# Patient Record
Sex: MALE | Race: WHITE | NOT HISPANIC OR LATINO | Employment: STUDENT | URBAN - METROPOLITAN AREA
[De-identification: names, ages, dates, MRNs, and addresses within clinical notes are randomized per-mention and may not be internally consistent; named-entity substitution may affect disease eponyms.]

---
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
## 2023-02-11 ENCOUNTER — HOSPITAL ENCOUNTER (EMERGENCY)
Facility: HOSPITAL | Age: 23
End: 2023-02-14
Attending: EMERGENCY MEDICINE

## 2023-02-11 DIAGNOSIS — R45.1 AGITATION: Primary | ICD-10-CM

## 2023-02-11 DIAGNOSIS — S00.81XA ABRASION OF FOREHEAD, INITIAL ENCOUNTER: ICD-10-CM

## 2023-02-11 DIAGNOSIS — Z87.898 HISTORY OF SEIZURE: ICD-10-CM

## 2023-02-11 DIAGNOSIS — F10.10 ALCOHOL ABUSE: ICD-10-CM

## 2023-02-11 NOTE — LETTER
[address] [facility] [address]  Dept: [phone]      EMTALA TRANSFER CONSENT    NAME Araseli Godfrey                                         2000                              MRN [id number]    I have been informed of my rights regarding examination, treatment, and transfer   by Dr Josesito Helm DO    Benefits: Specialized equipment and/or services available at the receiving facility (Include comment)________________________    Risks: Potential for delay in receiving treatment      { ED EMTALA TRANSFER CHOICES:[id number]}    I authorize the performance of emergency medical procedures and treatments upon me in both transit and upon arrival at the receiving facility  Additionally, I authorize the release of any and all medical records to the receiving facility and request they be transported with me, if possible  I understand that the safest mode of transportation during a medical emergency is an ambulance and that the Hospital advocates the use of this mode of transport  Risks of traveling to the receiving facility by car, including absence of medical control, life sustaining equipment, such as oxygen, and medical personnel has been explained to me and I fully understand them  (ADIS CORRECT BOX BELOW)  [  ]  I consent to the stated transfer and to be transported by ambulance/helicopter  [  ]  I consent to the stated transfer, but refuse transportation by ambulance and accept full responsibility for my transportation by car    I understand the risks of non-ambulance transfers and I exonerate the Hospital and its staff from any deterioration in my condition that results from this refusal     X___________________________________________    DATE  23  TIME________  Signature of patient or legally responsible individual signing on patient behalf           RELATIONSHIP TO PATIENT_________________________          Provider Certification    NAME Araseli Godfrey                                         2000                              MRN [id number]    A medical screening exam was performed on the above named patient  Based on the examination:    Condition Necessitating Transfer The primary encounter diagnosis was Agitation  Diagnoses of Abrasion of forehead, initial encounter, History of seizure, and Alcohol abuse were also pertinent to this visit  Patient Condition: The patient has been stabilized such that within reasonable medical probability, no material deterioration of the patient condition or the condition of the unborn child(neptali) is likely to result from the transfer    Reason for Transfer: Level of Care needed not available at this facility    Transfer Requirements: Facility [facility]   · Space available and qualified personnel available for treatment as acknowledged by Katia Albarran MA  · Agreed to accept transfer and to provide appropriate medical treatment as acknowledged by       Dr Maggie Reddy  · Appropriate medical records of the examination and treatment of the patient are provided at the time of transfer   [address] [place] [address] [address] _______  · Transfer will be performed by qualified personnel from [address] [place] [address]  and appropriate transfer equipment as required, including the use of necessary and appropriate life support measures      Provider Certification: I have examined the patient and explained the following risks and benefits of being transferred/refusing transfer to the patient/family:  General risk, such as traffic hazards, adverse weather conditions, rough terrain or turbulence, possible failure of equipment (including vehicle or aircraft), or consequences of actions of persons outside the control of the transport personnel      Based on these reasonable risks and benefits to the patient and/or the unborn child(neptali), and based upon the information available at the time of the patient’s examination, I certify that the medical benefits reasonably to be expected from the provision of appropriate medical treatments at another medical facility outweigh the increasing risks, if any, to the individual’s medical condition, and in the case of labor to the unborn child, from effecting the transfer      X____________________________________________ DATE 02/14/23        TIME_______      ORIGINAL - SEND TO MEDICAL RECORDS   COPY - SEND WITH PATIENT DURING TRANSFER

## 2023-02-11 NOTE — LETTER
[address] [facility] [address]  Dept: [phone]      EMTALA TRANSFER CONSENT    NAME Arely Patel                                         2000                              MRN [id number]    I have been informed of my rights regarding examination, treatment, and transfer   by Dr Arslan Arciniega DO    Benefits: Specialized equipment and/or services available at the receiving facility (Include comment)________________________    Risks: Potential for delay in receiving treatment      Transfer Request   I acknowledge that my medical condition has been evaluated and explained to me by the emergency department physician or other qualified medical person and/or my attending physician who has recommended and offered to me further medical examination and treatment  I understand the Hospital's obligation with respect to the treatment and stabilization of my emergency medical condition  I nevertheless request to be transferred  I release the Hospital, the doctor, and any other persons caring for me from all responsibility or liability for any injury or ill effects that may result from my transfer and agree to accept all responsibility for the consequences of my choice to transfer, rather than receive stabilizing treatment at the Hospital  I understand that because the transfer is my request, my insurance may not provide reimbursement for the services  The Hospital will assist and direct me and my family in how to make arrangements for transfer, but the hospital is not liable for any fees charged by the transport service  In spite of this understanding, I refuse to consent to further medical examination and treatment which has been offered to me, and request transfer to  [facility] Name, [address] [facility]   I authorize the performance of emergency medical procedures and treatments upon me in both transit and upon arrival at the receiving facility  Additionally, I authorize the release of any and all medical records to the receiving facility and request they be transported with me, if possible  I authorize the performance of emergency medical procedures and treatments upon me in both transit and upon arrival at the receiving facility  Additionally, I authorize the release of any and all medical records to the receiving facility and request they be transported with me, if possible  I understand that the safest mode of transportation during a medical emergency is an ambulance and that the Hospital advocates the use of this mode of transport  Risks of traveling to the receiving facility by car, including absence of medical control, life sustaining equipment, such as oxygen, and medical personnel has been explained to me and I fully understand them  (ADIS CORRECT BOX BELOW)  [  ]  I consent to the stated transfer and to be transported by ambulance/helicopter  [  ]  I consent to the stated transfer, but refuse transportation by ambulance and accept full responsibility for my transportation by car  I understand the risks of non-ambulance transfers and I exonerate the Hospital and its staff from any deterioration in my condition that results from this refusal     X___________________________________________    DATE  23  TIME________  Signature of patient or legally responsible individual signing on patient behalf           RELATIONSHIP TO PATIENT_________________________          Provider Certification    NAME Yolanda Gaytan                                         2000                              MRN [id number]    A medical screening exam was performed on the above named patient  Based on the examination:    Condition Necessitating Transfer The primary encounter diagnosis was Agitation  Diagnoses of Abrasion of forehead, initial encounter, History of seizure, and Alcohol abuse were also pertinent to this visit      Patient Condition: The patient has been stabilized such that within reasonable medical probability, no material deterioration of the patient condition or the condition of the unborn child(neptali) is likely to result from the transfer    Reason for Transfer: Level of Care needed not available at this facility    Transfer Requirements: Facility [facility]   · Space available and qualified personnel available for treatment as acknowledged by Sera Neville MA  · Agreed to accept transfer and to provide appropriate medical treatment as acknowledged by       Dr Joshua Augustin  · Appropriate medical records of the examination and treatment of the patient are provided at the time of transfer   [address] _______  · Transfer will be performed by qualified personnel from [facility]  and appropriate transfer equipment as required, including the use of necessary and appropriate life support measures  Provider Certification: I have examined the patient and explained the following risks and benefits of being transferred/refusing transfer to the patient/family:  General risk, such as traffic hazards, adverse weather conditions, rough terrain or turbulence, possible failure of equipment (including vehicle or aircraft), or consequences of actions of persons outside the control of the transport personnel      Based on these reasonable risks and benefits to the patient and/or the unborn child(neptali), and based upon the information available at the time of the patient’s examination, I certify that the medical benefits reasonably to be expected from the provision of appropriate medical treatments at another medical facility outweigh the increasing risks, if any, to the individual’s medical condition, and in the case of labor to the unborn child, from effecting the transfer      X____________________________________________ DATE 02/14/23        TIME_______      ORIGINAL - SEND TO MEDICAL RECORDS   COPY - SEND WITH PATIENT DURING TRANSFER

## 2023-02-12 ENCOUNTER — APPOINTMENT (EMERGENCY)
Dept: RADIOLOGY | Facility: HOSPITAL | Age: 23
End: 2023-02-12

## 2023-02-12 ENCOUNTER — APPOINTMENT (EMERGENCY)
Dept: CT IMAGING | Facility: HOSPITAL | Age: 23
End: 2023-02-12

## 2023-02-12 LAB
ALBUMIN SERPL BCP-MCNC: 5.3 G/DL (ref 3.5–5)
ALP SERPL-CCNC: 48 U/L (ref 34–104)
ALT SERPL W P-5'-P-CCNC: 12 U/L (ref 7–52)
AMPHETAMINES SERPL QL SCN: NEGATIVE
ANION GAP SERPL CALCULATED.3IONS-SCNC: 19 MMOL/L (ref 4–13)
APAP SERPL-MCNC: <10 UG/ML (ref 10–20)
AST SERPL W P-5'-P-CCNC: 18 U/L (ref 13–39)
BARBITURATES UR QL: NEGATIVE
BASOPHILS # BLD AUTO: 0.12 THOUSANDS/ÂΜL (ref 0–0.1)
BASOPHILS NFR BLD AUTO: 1 % (ref 0–1)
BENZODIAZ UR QL: NEGATIVE
BILIRUB SERPL-MCNC: 0.49 MG/DL (ref 0.2–1)
BUN SERPL-MCNC: 13 MG/DL (ref 5–25)
CALCIUM SERPL-MCNC: 10.1 MG/DL (ref 8.4–10.2)
CHLORIDE SERPL-SCNC: 103 MMOL/L (ref 96–108)
CO2 SERPL-SCNC: 16 MMOL/L (ref 21–32)
COCAINE UR QL: NEGATIVE
CREAT SERPL-MCNC: 1.03 MG/DL (ref 0.6–1.3)
EOSINOPHIL # BLD AUTO: 0.05 THOUSAND/ÂΜL (ref 0–0.61)
EOSINOPHIL NFR BLD AUTO: 0 % (ref 0–6)
ERYTHROCYTE [DISTWIDTH] IN BLOOD BY AUTOMATED COUNT: 12 % (ref 11.6–15.1)
ETHANOL SERPL-MCNC: 46 MG/DL
FLUAV RNA RESP QL NAA+PROBE: NEGATIVE
FLUBV RNA RESP QL NAA+PROBE: NEGATIVE
GFR SERPL CREATININE-BSD FRML MDRD: 103 ML/MIN/1.73SQ M
GLUCOSE SERPL-MCNC: 101 MG/DL (ref 65–140)
HCT VFR BLD AUTO: 45.6 % (ref 36.5–49.3)
HGB BLD-MCNC: 15.6 G/DL (ref 12–17)
IMM GRANULOCYTES # BLD AUTO: 0.08 THOUSAND/UL (ref 0–0.2)
IMM GRANULOCYTES NFR BLD AUTO: 1 % (ref 0–2)
LYMPHOCYTES # BLD AUTO: 1.49 THOUSANDS/ÂΜL (ref 0.6–4.47)
LYMPHOCYTES NFR BLD AUTO: 10 % (ref 14–44)
MCH RBC QN AUTO: 30.5 PG (ref 26.8–34.3)
MCHC RBC AUTO-ENTMCNC: 34.2 G/DL (ref 31.4–37.4)
MCV RBC AUTO: 89 FL (ref 82–98)
METHADONE UR QL: NEGATIVE
MONOCYTES # BLD AUTO: 0.92 THOUSAND/ÂΜL (ref 0.17–1.22)
MONOCYTES NFR BLD AUTO: 6 % (ref 4–12)
NEUTROPHILS # BLD AUTO: 12.05 THOUSANDS/ÂΜL (ref 1.85–7.62)
NEUTS SEG NFR BLD AUTO: 82 % (ref 43–75)
NRBC BLD AUTO-RTO: 0 /100 WBCS
OPIATES UR QL SCN: NEGATIVE
OXYCODONE+OXYMORPHONE UR QL SCN: NEGATIVE
PCP UR QL: NEGATIVE
PLATELET # BLD AUTO: 354 THOUSANDS/UL (ref 149–390)
PMV BLD AUTO: 10.3 FL (ref 8.9–12.7)
POTASSIUM SERPL-SCNC: 3.4 MMOL/L (ref 3.5–5.3)
PROT SERPL-MCNC: 7.7 G/DL (ref 6.4–8.4)
RBC # BLD AUTO: 5.11 MILLION/UL (ref 3.88–5.62)
RSV RNA RESP QL NAA+PROBE: NEGATIVE
SALICYLATES SERPL-MCNC: <5 MG/DL (ref 3–20)
SARS-COV-2 RNA RESP QL NAA+PROBE: NEGATIVE
SODIUM SERPL-SCNC: 138 MMOL/L (ref 135–147)
THC UR QL: POSITIVE
WBC # BLD AUTO: 14.71 THOUSAND/UL (ref 4.31–10.16)

## 2023-02-12 RX ORDER — OLANZAPINE 10 MG/1
10 INJECTION, POWDER, LYOPHILIZED, FOR SOLUTION INTRAMUSCULAR ONCE
Status: COMPLETED | OUTPATIENT
Start: 2023-02-12 | End: 2023-02-12

## 2023-02-12 RX ORDER — NICOTINE 21 MG/24HR
21 PATCH, TRANSDERMAL 24 HOURS TRANSDERMAL ONCE
Status: COMPLETED | OUTPATIENT
Start: 2023-02-12 | End: 2023-02-13

## 2023-02-12 RX ORDER — LORAZEPAM 2 MG/ML
2 INJECTION INTRAMUSCULAR ONCE
Status: COMPLETED | OUTPATIENT
Start: 2023-02-12 | End: 2023-02-12

## 2023-02-12 RX ORDER — LORAZEPAM 1 MG/1
1 TABLET ORAL ONCE
Status: COMPLETED | OUTPATIENT
Start: 2023-02-12 | End: 2023-02-12

## 2023-02-12 RX ADMIN — NICOTINE 21 MG: 21 PATCH, EXTENDED RELEASE TRANSDERMAL at 18:22

## 2023-02-12 RX ADMIN — LORAZEPAM 1 MG: 1 TABLET ORAL at 18:22

## 2023-02-12 RX ADMIN — OLANZAPINE 10 MG: 10 INJECTION, POWDER, FOR SOLUTION INTRAMUSCULAR at 01:30

## 2023-02-12 RX ADMIN — LORAZEPAM 2 MG: 2 INJECTION INTRAMUSCULAR; INTRAVENOUS at 01:43

## 2023-02-12 NOTE — ED NOTES
Pt  Lying on ground  Went in to assess and pt  And assisted him onto the bed and he was placed on the bed with a blanket  Respirations easy at present       Anderson Ahumada, RN  02/12/23 5076

## 2023-02-12 NOTE — CONSULTS
TeleConsultation - [address] Zemaitaitis 25 y o  male MRN: [id number]  Unit/Bed#: Ingrid Maharaj 02 Encounter: [id number]        REQUIRED DOCUMENTATION:     1  This service was provided via Telemedicine  2  Provider located at [facility]   3  TeleMed provider: Frankie Corrales MD   4  Identify all parties in room with patient during tele consult: Patient   5  Patient was then informed that this was a Telemedicine visit and that the exam was being conducted confidentially over secure lines  My office door was closed  No one else was in the room  Patient acknowledged consent and understanding of privacy and security of the Telemedicine visit, and gave us permission to have the assistant stay in the room in order to assist with the history and to conduct the exam   I informed the patient that I have reviewed their record in Epic and presented the opportunity for them to ask any questions regarding the visit today  The patient agreed to participate  Discussed with Anum SORENSEN    Assessment/Plan     Active Problems:    * No active hospital problems  *    Assessment:  Shamir Malik is a 26 y/o male with PMH significant for Depression that presented on 302 for SI  Patient was intoxicated at the time of SI but has multiple risk factors that make him an acute or imminent safety concern warranting involuntary IP psychiatric admission  Suicide Risk Assessment: Acute: High, Imminent  Chronic: High,    Primary risk factors are Prior SAs, Substance Use, Impulsivity, Recent relationship loss  However, patient has access to and desire for care  He is future-oriented and cites family as reasons for living  Unspecified Depressive Disorder    Treatment Plan:    Planned Medication Changes:    -None     Current Medications:         Risks / Benefits of Treatment:    Risks, benefits, and possible side effects of medications explained to patient and patient verbalizes understanding        Inpatient consult to Psychiatry  Consult performed by: Do Fonseca MD  Consult ordered by: Crhistine Thompson DO        Physician Requesting Consult: Christine Thompson , *  Principal Problem:<principal problem not specified>    Reason for Consult: Psych Evaluation       History of Present Illness      Per Crisis Note by Brice Johnson:   Crisis met with pt to complete Crisis Intake and Safety Risk Assessment  Introduce self, role, and evaluation process  Pt is a 25year old male who presents in ED on a 302 for a psychiatric evaluation  Per 302 statement: `Cachorro stated to his girlfriend that he wanted to  "kill himself" and "couldn;t do it anymore " 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  Cachorro's cell phone was tracked to the bridge in [place]  He was located hiding underneath  Once located,he took off  After a foot sriram he was detained  When asked why he ran, he stated he was afraid  When asked afraid of what, he stated "at what I may do to myself " Pt was calm and cooperative during conversation  He appeared alert and oriented  Pt said "people lied on me to the police and said I wanted to jump over a bridge "  Pt refused to answer crisis questions and said he wants to go home, and is not willing to sign a 201  Pt presented his rights  Pt appear to understand these rights  Pt denies SI, HI, AVH or thoughts to self harm  Pt is willing to speak with psychiatry  Patient states that he came in due to drinking stating that he has some fines to pay and needs a job  Patient states that he is not suicidal currently and never had said that he was  Patient states that he remembers everything that happened the night of his intoxication and refused to answer questions regarding the break up  Patient denied any current SI/HI/AVH       Psychiatric Review Of Systems:    sleep: no  appetite changes: no  weight changes: no  energy/anergy: no  interest/pleasure/anhedonia: no  somatic symptoms: no  anxiety/panic: no  iris: no  guilty/hopeless: no  self injurious behavior/risky behavior: no    Historical Information     Past Psychiatric History:     Psychiatric Hospitalizations:   • One past inpatient [facility]   Outpatient Treatment History:   • Denied  Suicide Attempts:   • Yes, one attempt by hanging  History of self-harm:   • None  Violence History:   • yes  Past Psychiatric medication trials: Denied     Substance Abuse History: Patient states that he had stopped drinking but had trouble in the past  Patient states that he smokes both cannabis and cigarettes         Family Psychiatric History: Denied         Social History:     Education: high school diploma/GED  Learning Disabilities: Denied  Marital history: single  Children: Denied  Living arrangement, social support: The patient lives in home with alone  Occupational History: unknown occupation  Functioning Relationships: good support system    Other Pertinent History: None    Traumatic History:     Abuse: None  Other Traumatic Events: none    Past Medical History:   Diagnosis Date   • Depression        Medical Review Of Systems:    Review of Systems    Meds/Allergies     all current active meds have been reviewed  No Known Allergies    Objective     Vital signs in last 24 hours:  Temp:  [96 8 °F (36 °C)] 96 8 °F (36 °C)  HR:  [74] 74  Resp:  [16] 16  BP: (110)/(60) 110/60    No intake or output data in the 24 hours ending 02/12/23 1522    Mental Status Evaluation:    Appearance:  age appropriate   Behavior:  normal   Speech:  normal pitch and normal volume   Mood:  dysthymic   Affect:  labile   Language: naming objects   Thought Process:  logical   Associations intact associations   Thought Content:  normal   Perceptual Disturbances: None   Risk Potential: Suicidal Ideations none  Homicidal Ideations none  Potential for Aggression No   Sensorium:  person, place and time/date   Cognition:  recent and remote memory grossly intact   Consciousness:  alert    Attention: attention span and concentration were age appropriate   Intellect: within normal limits   Fund of Knowledge: awareness of current events: President   Insight:  limited   Judgment: limited   Muscle Strength:  Muscle Tone: normal NFT  normal   Gait/Station: normal gait/station   Motor Activity: no abnormal movements       Lab Results: I have personally reviewed all pertinent laboratory/tests results  Most Recent Labs:   Lab Results   Component Value Date    WBC 14 71 (H) 02/12/2023    RBC 5 11 02/12/2023    HGB 15 6 02/12/2023    HCT 45 6 02/12/2023     02/12/2023    RDW 12 0 02/12/2023    NEUTROABS 12 05 (H) 02/12/2023    SODIUM 138 02/12/2023    K 3 4 (L) 02/12/2023     02/12/2023    CO2 16 (L) 02/12/2023    BUN 13 02/12/2023    CREATININE 1 03 02/12/2023    GLUC 101 02/12/2023    CALCIUM 10 1 02/12/2023    AST 18 02/12/2023    ALT 12 02/12/2023    ALKPHOS 48 02/12/2023    TP 7 7 02/12/2023    ALB 5 3 (H) 02/12/2023    TBILI 0 49 02/12/2023     Drug Screen:   Lab Results   Component Value Date    AMPMETHUR Negative 02/12/2023    BARBTUR Negative 02/12/2023    BDZUR Negative 02/12/2023    THCUR Positive (A) 02/12/2023    COCAINEUR Negative 02/12/2023    METHADONEUR Negative 02/12/2023    OPIATEUR Negative 02/12/2023    PCPUR Negative 02/12/2023       Imaging Studies: CT head without contrast    Result Date: 2/12/2023  Narrative: CT BRAIN - WITHOUT CONTRAST INDICATION:   fall w head trauma  COMPARISON:  None  TECHNIQUE:  CT examination of the brain was performed  In addition to axial images, sagittal and coronal 2D reformatted images were created and submitted for interpretation  Radiation dose length product (DLP) for this visit:  905 85 mGy-cm     This examination, like all CT scans performed in the [facility], was performed utilizing techniques to minimize radiation dose exposure, including the use of iterative  reconstruction and automated exposure control  IMAGE QUALITY:  Diagnostic  FINDINGS: PARENCHYMA:  No intracranial mass, mass effect or midline shift  No CT signs of acute infarction  No acute parenchymal hemorrhage  VENTRICLES AND EXTRA-AXIAL SPACES:  Normal for the patient's age  VISUALIZED ORBITS: Normal visualized orbits  PARANASAL SINUSES: Normal visualized paranasal sinuses  CALVARIUM AND EXTRACRANIAL SOFT TISSUES:  Normal      Impression: No acute intracranial abnormality  Workstation performed: [id number]     EKG/Pathology/Other Studies: No results found for: VENTRATE, ATRIALRATE, PRINT, QRSDINT, QTINT, QTCINT, PAXIS, QRSAXIS, TWAVEAXIS     Code Status: No Order  Advance Directive and Living Will:      Power of :    POLST:      Counseling / Coordination of Care: Total floor / unit time spent today 30 minutes  Greater than 50% of total time was spent with the patient and / or family counseling and / or coordination of care  A description of the counseling / coordination of care: Direct Patient Care, Chart Review, and Documentation

## 2023-02-12 NOTE — ED NOTES
Lauren Suicide Risk Assessment deferred, as unable to assess while patient sleeping  Behavioral Health Assessment deferred as patient is sleeping and would benefit from additional rest   Vital signs deferred until patient awake, no signs or symptoms of respiratory distress at this time  Once patient is awake and able to participate, will complete assessments  Remains on continual observation by Lord Brut    Resting between disturbances     Eileen Elizondo RN  02/12/23 8640

## 2023-02-12 NOTE — ED NOTES
Pt  Continues to be extremely agitated , pacing the floor, throwing the mattress off the bed  Control team called  Offered to have him speak to his mother for 5 minutes on phone, however, he said he not able to speak for a short period  Dr Rosario Heading aware  Pt  Requesting only myself medicating him  Injections given with Control Team around me due to pt  Hostility  Pt  Continuing to tighten his muscles and stand with his chest muscles tight       Fahad Khan RN  02/12/23 1319

## 2023-02-12 NOTE — ED NOTES
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  Cachorro's cell phone was tracked to the bridge in [place]  He was located hiding underneath  Once located,he took off  After a foot sriram he was detained  When asked why he ran, he stated he was afraid   When asked afraid of what, he stated "at what I may do to myself "     Pt was calm and cooperative during conversation  He appeared alert and oriented  Pt said "people lied on me to the police and said I wanted to jump over a bridge "  Pt refused to answer crisis questions and said he wants to go home, and is not willing to sign a 201  Pt presented his rights  Pt appear to understand these rights  Pt denies SI, HI, AVH or thoughts to self harm  Pt is willing to speak with psychiatry

## 2023-02-12 NOTE — ED NOTES
Pt  States drank 5 beers tonight  Pt  States jumped off support beam approx  4 feet from ground and has abrasion on forehead and also has abrasions on knuckles from boxing which he does to work out  Denies LOC   Placed in scrubs and personal items placed in locker #5     Amador Keyes RN  02/12/23 0005

## 2023-02-12 NOTE — ED PROVIDER NOTES
History  Chief Complaint   Patient presents with   • Suicidal     States having a hard time and was talking to girlfriend who he broke up with  States was drinking and was hiding under [employer] police brought him in on a 302 petition  Denies suicidal thoughts  States family was worried that he was going to hurt himself  Pt  Penny Tay to sit down in room during interview  Patient is a 81-year-old male with a history of PTSD and depression the presents for evaluation of involuntary psychiatric admission  Patient has 188 signed by police that alleges that he told his girlfriend that he was going to hurt himself and subsequently sent to the police he is scared of what he may do to himself  He was found underneath a bridge and does admit to jumping off a 7 foot embankment  Patient is currently denying suicidal homicidal ideations  Also auditory visual hallucinations  He did drink some alcohol earlier this evening  Girlfriend apparently broke up with him that set him off  He denies physical complaints including headache nausea vomiting chest pain just abdominal pain  Abrasion noted to the forehead and patient does have abrasions over his knuckles starting that the patient may have been punching something  Today, the patient was calm and cooperative  However, he became increasing agitated and was verbally and physically aggressive  Decision to uphold the 302 was made and patient required chemical restraint             None       Past Medical History:   Diagnosis Date   • Depression        History reviewed  No pertinent surgical history  History reviewed  No pertinent family history  I have reviewed and agree with the history as documented      E-Cigarette/Vaping     E-Cigarette/Vaping Substances     Social History     Tobacco Use   • Smoking status: Never   • Smokeless tobacco: Never   Substance Use Topics   • Alcohol use: Not Currently   • Drug use: Not Currently     Types: Marijuana Review of Systems   Constitutional: Negative for fever  HENT: Negative for sore throat  Eyes: Negative for photophobia  Respiratory: Negative for shortness of breath  Cardiovascular: Negative for chest pain  Gastrointestinal: Negative for abdominal pain  Genitourinary: Negative for dysuria  Musculoskeletal: Negative for back pain  Skin: Negative for rash  Neurological: Negative for light-headedness  Hematological: Negative for adenopathy  Psychiatric/Behavioral: Negative for agitation  The patient is nervous/anxious  All other systems reviewed and are negative  Physical Exam  Physical Exam  Vitals reviewed  Constitutional:       General: He is not in acute distress  Appearance: He is well-developed  HENT:      Head: Normocephalic  Comments: Abrasion on the forehead  Eyes:      Pupils: Pupils are equal, round, and reactive to light  Cardiovascular:      Rate and Rhythm: Normal rate and regular rhythm  Heart sounds: Normal heart sounds  No murmur heard  No friction rub  No gallop  Pulmonary:      Effort: Pulmonary effort is normal       Breath sounds: Normal breath sounds  Abdominal:      General: Bowel sounds are normal  There is no distension  Palpations: Abdomen is soft  Tenderness: There is no abdominal tenderness  There is no guarding  Musculoskeletal:         General: Normal range of motion  Cervical back: Normal range of motion and neck supple  Comments: Abrasion on knuckles of both hands, no bony tenderness   Skin:     Capillary Refill: Capillary refill takes less than 2 seconds  Neurological:      Mental Status: He is alert and oriented to person, place, and time  Cranial Nerves: No cranial nerve deficit  Sensory: No sensory deficit  Motor: No abnormal muscle tone  Psychiatric:         Behavior: Behavior normal          Thought Content:  Thought content normal          Judgment: Judgment normal  Vital Signs  ED Triage Vitals [02/12/23 0002]   Temperature Pulse Respirations Blood Pressure SpO2   (!) 96 8 °F (36 °C) 74 16 110/60 98 %      Temp Source Heart Rate Source Patient Position - Orthostatic VS BP Location FiO2 (%)   Tympanic -- Standing Left arm --      Pain Score       5           Vitals:    02/12/23 0002   BP: 110/60   Pulse: 74   Patient Position - Orthostatic VS: Standing         Visual Acuity      ED Medications  Medications   OLANZapine (ZyPREXA) IM injection 10 mg (10 mg Intramuscular Given 2/12/23 0130)   LORazepam (ATIVAN) injection 2 mg (2 mg Intramuscular Given 2/12/23 0143)       Diagnostic Studies  Results Reviewed     Procedure Component Value Units Date/Time    FLU/RSV/COVID - if FLU/RSV clinically relevant [[id number]]  (Normal) Collected: 02/12/23 0034    Lab Status: Final result Specimen: Nares from Nose Updated: 02/12/23 0127     SARS-CoV-2 Negative     INFLUENZA A PCR Negative     INFLUENZA B PCR Negative     RSV PCR Negative    Narrative:      FOR PEDIATRIC PATIENTS - copy/paste COVID Guidelines URL to browser: [email or web address]/  [employer]    SARS-CoV-2 assay is a Nucleic Acid Amplification assay intended for the  qualitative detection of nucleic acid from SARS-CoV-2 in nasopharyngeal  swabs  Results are for the presumptive identification of SARS-CoV-2 RNA  Positive results are indicative of infection with SARS-CoV-2, the virus  causing COVID-19, but do not rule out bacterial infection or co-infection  with other viruses  Laboratories within the [place] and its  territories are required to report all positive results to the appropriate  public health authorities  Negative results do not preclude SARS-CoV-2  infection and should not be used as the sole basis for treatment or other  patient management decisions   Negative results must be combined with  clinical observations, patient history, and epidemiological information  This test has not been FDA cleared or approved  This test has been authorized by FDA under an Emergency Use Authorization  (EUA)  This test is only authorized for the duration of time the  declaration that circumstances exist justifying the authorization of the  emergency use of an in vitro diagnostic tests for detection of SARS-CoV-2  virus and/or diagnosis of COVID-19 infection under section 564(b)(1) of  the Act, 21 U  S C  313TXF-6(V)(4), unless the authorization is terminated  or revoked sooner  The test has been validated but independent review by FDA  and CLIA is pending  Test performed using [employer] GeneXpert: This RT-PCR assay targets N2,  a region unique to SARS-CoV-2  A conserved region in the E-gene was chosen  for pan-Sarbecovirus detection which includes SARS-CoV-2  According to CMS-2020-01-R, this platform meets the definition of high-throughput technology      Ethanol [[id number]]  (Abnormal) Collected: 02/12/23 0034    Lab Status: Final result Specimen: Blood from Arm, Left Updated: 02/12/23 0112     Ethanol Lvl 46 mg/dL     Comprehensive metabolic panel [[id number]]  (Abnormal) Collected: 02/12/23 0034    Lab Status: Final result Specimen: Blood from Arm, Left Updated: 02/12/23 0110     Sodium 138 mmol/L      Potassium 3 4 mmol/L      Chloride 103 mmol/L      CO2 16 mmol/L      ANION GAP 19 mmol/L      BUN 13 mg/dL      Creatinine 1 03 mg/dL      Glucose 101 mg/dL      Calcium 10 1 mg/dL      AST 18 U/L      ALT 12 U/L      Alkaline Phosphatase 48 U/L      Total Protein 7 7 g/dL      Albumin 5 3 g/dL      Total Bilirubin 0 49 mg/dL      eGFR 103 ml/min/1 73sq m     Narrative:      [place] guidelines for Chronic Kidney Disease (CKD):   •  Stage 1 with normal or high GFR (GFR > 90 mL/min/1 73 square meters)  •  Stage 2 Mild CKD (GFR = 60-89 mL/min/1 73 square meters)  •  Stage 3A Moderate CKD (GFR = 45-59 mL/min/1 73 square meters)  •  Stage 3B Moderate CKD (GFR = 30-44 mL/min/1 73 square meters)  •  Stage 4 Severe CKD (GFR = 15-29 mL/min/1 73 square meters)  •  Stage 5 End Stage CKD (GFR <15 mL/min/1 73 square meters)  Note: GFR calculation is accurate only with a steady state creatinine    Salicylate level [[id number]]  (Normal) Collected: 02/12/23 0034    Lab Status: Final result Specimen: Blood from Arm, Left Updated: 31/53/15 0417     Salicylate Lvl <5 mg/dL     Acetaminophen level-If concentration is detectable, please discuss with medical  on call  [[id number]]  (Abnormal) Collected: 02/12/23 0034    Lab Status: Final result Specimen: Blood from Arm, Left Updated: 02/12/23 0110     Acetaminophen Level <10 ug/mL     CBC and differential [[id number]]  (Abnormal) Collected: 02/12/23 0034    Lab Status: Final result Specimen: Blood from Arm, Left Updated: 02/12/23 0048     WBC 14 71 Thousand/uL      RBC 5 11 Million/uL      Hemoglobin 15 6 g/dL      Hematocrit 45 6 %      MCV 89 fL      MCH 30 5 pg      MCHC 34 2 g/dL      RDW 12 0 %      MPV 10 3 fL      Platelets 370 Thousands/uL      nRBC 0 /100 WBCs      Neutrophils Relative 82 %      Immat GRANS % 1 %      Lymphocytes Relative 10 %      Monocytes Relative 6 %      Eosinophils Relative 0 %      Basophils Relative 1 %      Neutrophils Absolute 12 05 Thousands/µL      Immature Grans Absolute 0 08 Thousand/uL      Lymphocytes Absolute 1 49 Thousands/µL      Monocytes Absolute 0 92 Thousand/µL      Eosinophils Absolute 0 05 Thousand/µL      Basophils Absolute 0 12 Thousands/µL     Rapid drug screen, urine [[id number]]     Lab Status: No result Specimen: Urine                  CT head without contrast   Final Result by Rich Lange MD (02/12 0135)      No acute intracranial abnormality                    Workstation performed: [id number]         XR hand 3+ views RIGHT    (Results Pending)   XR hand 3+ views LEFT    (Results Pending)              Procedures  CriticalCare Time  Performed by: Winter Tyler Fior Macias MD  Authorized by: Fernando Sanchez MD     Critical care provider statement:     Critical care time (minutes):  40    Critical care time was exclusive of:  Separately billable procedures and treating other patients and teaching time    Critical care was necessary to treat or prevent imminent or life-threatening deterioration of the following conditions: agitation  Critical care was time spent personally by me on the following activities:  Blood draw for specimens, obtaining history from patient or surrogate, examination of patient, re-evaluation of patient's condition, ordering and performing treatments and interventions, ordering and review of radiographic studies and ordering and review of laboratory studies    I assumed direction of critical care for this patient from another provider in my specialty: no    Comments:      Call sedation with 2 agents             ED Course                                             Medical Decision Making  Patient is a 78-year-old male who presents for evaluation of worsening anxiety depression and alleged suicidal ideations  Patient became agitated and was physically and verbally aggressive  Plan to uphold the 302 and pending psychiatric placement at the time of signout  Patient did require sedation with Zyprexa and Ativan secondary to punching the door and threatening gestures  Abrasion of forehead, initial encounter: complicated acute illness or injury  Agitation: complicated acute illness or injury  Amount and/or Complexity of Data Reviewed  Labs: ordered  Radiology: ordered  Risk  Prescription drug management  Decision regarding hospitalization            Disposition  Final diagnoses:   Agitation   Abrasion of forehead, initial encounter     Time reflects when diagnosis was documented in both MDM as applicable and the Disposition within this note     Time User Action Codes Description Comment    2/12/2023  2:19 AM Emiliano Tesfaye Add [R45 1] Agitation     2/12/2023  2:19 AM Lesia Jj Add [S00 81XA] Abrasion of forehead, initial encounter       ED Disposition     ED Disposition   Transfer to [address] [place] [address]   --    Date/Time   Sun Feb 12, 2023  2:18 AM    Comment   Sergey [facility] should be transferred out to [facility] and has been medically cleared  Follow-up Information    None         Patient's Medications    No medications on file       No discharge procedures on file      PDMP Review     None          ED Provider  Electronically Signed by           Maddie Antunez MD  02/12/23 1205

## 2023-02-13 ENCOUNTER — APPOINTMENT (EMERGENCY)
Dept: RADIOLOGY | Facility: HOSPITAL | Age: 23
End: 2023-02-13

## 2023-02-13 RX ORDER — LORAZEPAM 1 MG/1
2 TABLET ORAL ONCE
Status: COMPLETED | OUTPATIENT
Start: 2023-02-13 | End: 2023-02-13

## 2023-02-13 RX ORDER — NICOTINE 21 MG/24HR
21 PATCH, TRANSDERMAL 24 HOURS TRANSDERMAL ONCE
Status: COMPLETED | OUTPATIENT
Start: 2023-02-13 | End: 2023-02-14

## 2023-02-13 RX ORDER — ZIPRASIDONE MESYLATE 20 MG/ML
20 INJECTION, POWDER, LYOPHILIZED, FOR SOLUTION INTRAMUSCULAR ONCE
Status: COMPLETED | OUTPATIENT
Start: 2023-02-13 | End: 2023-02-13

## 2023-02-13 RX ORDER — LORAZEPAM 1 MG/1
1 TABLET ORAL ONCE
Status: COMPLETED | OUTPATIENT
Start: 2023-02-13 | End: 2023-02-13

## 2023-02-13 RX ADMIN — ZIPRASIDONE MESYLATE 20 MG: 20 INJECTION, POWDER, LYOPHILIZED, FOR SOLUTION INTRAMUSCULAR at 21:50

## 2023-02-13 RX ADMIN — LORAZEPAM 1 MG: 1 TABLET ORAL at 20:01

## 2023-02-13 RX ADMIN — NICOTINE 21 MG: 21 PATCH, EXTENDED RELEASE TRANSDERMAL at 14:53

## 2023-02-13 RX ADMIN — LORAZEPAM 1 MG: 1 TABLET ORAL at 18:24

## 2023-02-13 RX ADMIN — LORAZEPAM 2 MG: 1 TABLET ORAL at 04:41

## 2023-02-13 NOTE — ED NOTES
6 [facility] with Danilo Emmanuel, Dual beds only    Ania Vini Abiolalupe 38 with [facility] Pipe, female beds only    [place]- Spoke with Abdulkadir Vance, No beds    ! 97 [facility] with Sharif, [address] [place], [place] [address]y with Hortense, No Beds    ! Al  Jazz 96 with [place], Faxed Clinicals    ! 2260 [facility] with Adriana Delay    ! Sumit Bran 126 with [facility] Seeds    ! [address] with Hannah Chavez    ! [place]- Spoke with [facility]

## 2023-02-13 NOTE — ED NOTES
[facility], as unable to assess while patient sleeping  Behavioral Health Assessment deferred as patient is sleeping and would benefit from additional rest   Vital signs deferred until patient awake, no signs or symptoms of respiratory distress at this time  Once patient is awake and able to participate, will complete assessments         Ashlyn Crowley RN  02/13/23 4268

## 2023-02-13 NOTE — ED NOTES
Spoke with Joe Garcia at [facility], requested chart to be refaxed  Chart was refaxed

## 2023-02-13 NOTE — ED NOTES
Call placed to the [place] in [place], [phone]  Spoke with Orville Hansen stated they do have male beds available, but they are unable to find patient in their system

## 2023-02-13 NOTE — ED NOTES
Patient requested to talk to Crisis  Patient holding his cell phone, crying upset that he called his family who told him he cannot return home  Crisis provided emotional support  Patient refusing to give his phone back  Nurse notified

## 2023-02-13 NOTE — ED NOTES
Bed search: Intake- per Richard Eric, beds available faxed referral    [facility]iel- per admission/Merline, only female dual diagnosis beds      Des[facility] [place]- per admission/Miguelito, only very low acuity male bed    [place]- per admission/Yeni,no beds available    [place]- per admission/no beds available    [facility]- per admission/Jesus, no beds available    [place]- per admission/Oren, no beds available    [facility]- per admission/Jonh, only low acuity male beds

## 2023-02-13 NOTE — ED NOTES
Lauren Suicide Risk Assessment deferred, as unable to assess while patient sleeping  Behavioral Health Assessment deferred as patient is sleeping and would benefit from additional rest   Vital signs deferred until patient awake, no signs or symptoms of respiratory distress at this time  Once patient is awake and able to participate, will complete assessments         Ishan Mendieta RN  02/13/23 0429

## 2023-02-13 NOTE — ED NOTES
Pt  Requesting Nicotine patch  Dr Diana Munoz  Old patch removed       Fahad Khan, RN  02/13/23 5477

## 2023-02-13 NOTE — ED NOTES
Pt  Sudeep Rough by therapy dog  Tolerated well  Became tearful during visit        Amador Keyes, RN  02/13/23 8322

## 2023-02-13 NOTE — ED NOTES
Awoke, pt noted w/ cell phone bedside, pt requested to have phone charged, phone placed in locker with other belongings  Water given  Pt returned to sleep        Marin Ledezma RN  02/13/23 8371

## 2023-02-13 NOTE — ED NOTES
Pt required the following assessments - BH Assessment Simple, BH Assessment Complex and BH Suicide Assessment; however pt is sleeping; determined more beneficial to allow pt to sleep and assessments will be completed when pt is awake     Ana Esparza RN  02/13/23 0773

## 2023-02-13 NOTE — ED NOTES
Pt in bathroom getting a shower at this time  Pt has asked several times about getting his phone back       Flakito Catherine  02/13/23 4214

## 2023-02-13 NOTE — ED CARE HANDOFF
Emergency Department Sign Out Note        Sign out and transfer of care from Dr Magdaleno Hanks  See Separate Emergency Department note  The patient, Roque Arora, was evaluated by the previous provider for SI/201  Workup Completed:  Psych clearance    ED Course / Workup Pending (followup):                                      ED Course as of 02/12/23 2235   Sun Feb 12, 2023 2230 Received in sign out:     Pt is 201 for SI    Medically cleared:   CBC, CMP, ETOH and Salicylate/Tyelnol levels  Uds  Covid/flu    No issues during last shift    Bed search in progress         Procedures  MDM        Disposition  Final diagnoses:   Agitation   Abrasion of forehead, initial encounter     Time reflects when diagnosis was documented in both MDM as applicable and the Disposition within this note     Time User Action Codes Description Comment    2/12/2023  2:19 AM Anthony Piedra Add [R45 1] Agitation     2/12/2023  2:19 AM Magaly Jj Add [S00 81XA] Abrasion of forehead, initial encounter       ED Disposition     ED Disposition   Transfer to [address] [place] [address]   --    Date/Time   Sun Feb 12, 2023  2:18 AM    Comment   Brennen Charles [facility] should be transferred out to [facility] and has been medically cleared  Follow-up Information    None       Patient's Medications    No medications on file     No discharge procedures on file         ED Provider  Electronically Signed by     Felice Dhillon MD  02/12/23 2230

## 2023-02-13 NOTE — ED NOTES
Bed search: Intake- per Brenden Danger, beds available faxed referral    Cass [facility]- per admission/Merline, only female dual diagnosis beds      Ace Given- per admission/Miguelito, only very low acuity male bed    [place]- per admission/Yeni,no beds available    [place]- per admission/no beds available    [facility]- per admission/Jesus, no beds available    [facility]- per admission/Oren, no beds available

## 2023-02-13 NOTE — ED NOTES
[place] Suicide Risk Assessment deferred, as unable to assess while patient sleeping  Behavioral Health Assessment deferred as patient is sleeping and would benefit from additional rest   Vital signs deferred until patient awake, no signs or symptoms of respiratory distress at this time  Once patient is awake and able to participate, will complete assessments         Ishan Mendieta RN  02/13/23 042

## 2023-02-13 NOTE — ED CARE HANDOFF
Emergency Department Sign Out Note        Sign out and transfer of care from Dr Lucie Ragsdale  See Separate Emergency Department note  The patient, Tamir Carrasquillo, was evaluated by the previous provider for psych evaluation  Workup Completed:  Behavioral health labs    ED Course / Workup Pending (followup):  Pending placement         Patient resting comfortably in bed  NAD  Equal chest rise B/L    Per staff patient did require some PO ativan overnight but otherwise no other issues  ED Course as of 02/13/23 0725   Mon Feb 13, 2023   0657 SI, signed 759, pending placement  Procedures  MDM        Disposition  Final diagnoses:   Agitation   Abrasion of forehead, initial encounter     Time reflects when diagnosis was documented in both MDM as applicable and the Disposition within this note     Time User Action Codes Description Comment    2/12/2023  2:19 AM Kerrie Herndon Add [R45 1] Agitation     2/12/2023  2:19 AM Everton Jj Add [S00 81XA] Abrasion of forehead, initial encounter       ED Disposition     ED Disposition   Transfer to [address] [place] [address]   --    Date/Time   Sun Feb 12, 2023  2:18 AM    Comment   Angie [facility] CARE should be transferred out to [address] [place] [address] and has been medically cleared  Follow-up Information    None       Patient's Medications    No medications on file     No discharge procedures on file         ED Provider  Electronically Signed by     Jose Vilchis DO  02/13/23 6139

## 2023-02-13 NOTE — ED NOTES
[facility] Suicide Risk Assessment deferred, as unable to assess while patient sleeping  Behavioral Health Assessment deferred as patient is sleeping and would benefit from additional rest   Vital signs deferred until patient awake, no signs or symptoms of respiratory distress at this time  Once patient is awake and able to participate, will complete assessments         Latrice Ruff RN  02/13/23 7059

## 2023-02-14 ENCOUNTER — HOSPITAL ENCOUNTER (INPATIENT)
Facility: HOSPITAL | Age: 23
LOS: 3 days | Discharge: HOME | End: 2023-02-17
Attending: HOSPITALIST | Admitting: HOSPITALIST

## 2023-02-14 VITALS
HEIGHT: 74 IN | RESPIRATION RATE: 16 BRPM | TEMPERATURE: 98 F | SYSTOLIC BLOOD PRESSURE: 132 MMHG | BODY MASS INDEX: 18.39 KG/M2 | DIASTOLIC BLOOD PRESSURE: 82 MMHG | HEART RATE: 87 BPM | OXYGEN SATURATION: 100 % | WEIGHT: 143.3 LBS

## 2023-02-14 DIAGNOSIS — S00.81XA ABRASION OF FOREHEAD, INITIAL ENCOUNTER: ICD-10-CM

## 2023-02-14 DIAGNOSIS — Z87.898 HISTORY OF SEIZURE: ICD-10-CM

## 2023-02-14 DIAGNOSIS — F10.10 ALCOHOL ABUSE: ICD-10-CM

## 2023-02-14 LAB — SARS-COV-2 RNA RESP QL NAA+PROBE: NEGATIVE

## 2023-02-14 RX ORDER — ACETAMINOPHEN 325 MG/1
650 TABLET ORAL EVERY 4 HOURS PRN
Status: DISCONTINUED | OUTPATIENT
Start: 2023-02-14 | End: 2023-02-17 | Stop reason: HOSPADM

## 2023-02-14 RX ORDER — HALOPERIDOL 5 MG/ML
5 INJECTION INTRAMUSCULAR
Status: DISCONTINUED | OUTPATIENT
Start: 2023-02-14 | End: 2023-02-17 | Stop reason: HOSPADM

## 2023-02-14 RX ORDER — POLYETHYLENE GLYCOL 3350 17 G/17G
17 POWDER, FOR SOLUTION ORAL DAILY PRN
Status: CANCELLED | OUTPATIENT
Start: 2023-02-14

## 2023-02-14 RX ORDER — HALOPERIDOL 5 MG/ML
5 INJECTION INTRAMUSCULAR
Status: CANCELLED | OUTPATIENT
Start: 2023-02-14

## 2023-02-14 RX ORDER — BENZTROPINE MESYLATE 1 MG/1
1 TABLET ORAL EVERY 6 HOURS PRN
Status: DISCONTINUED | OUTPATIENT
Start: 2023-02-14 | End: 2023-02-17 | Stop reason: HOSPADM

## 2023-02-14 RX ORDER — BENZTROPINE MESYLATE 1 MG/ML
1 INJECTION INTRAMUSCULAR; INTRAVENOUS
Status: CANCELLED | OUTPATIENT
Start: 2023-02-14

## 2023-02-14 RX ORDER — BENZTROPINE MESYLATE 0.5 MG/1
1 TABLET ORAL EVERY 6 HOURS PRN
Status: CANCELLED | OUTPATIENT
Start: 2023-02-14

## 2023-02-14 RX ORDER — BENZTROPINE MESYLATE 1 MG/ML
0.5 INJECTION INTRAMUSCULAR; INTRAVENOUS
Status: CANCELLED | OUTPATIENT
Start: 2023-02-14

## 2023-02-14 RX ORDER — LORAZEPAM 2 MG/ML
2 INJECTION INTRAMUSCULAR
Status: DISCONTINUED | OUTPATIENT
Start: 2023-02-14 | End: 2023-02-17 | Stop reason: HOSPADM

## 2023-02-14 RX ORDER — HALOPERIDOL 2 MG/1
2 TABLET ORAL
Status: DISCONTINUED | OUTPATIENT
Start: 2023-02-14 | End: 2023-02-17 | Stop reason: HOSPADM

## 2023-02-14 RX ORDER — BENZTROPINE MESYLATE 1 MG/ML
0.5 INJECTION INTRAMUSCULAR; INTRAVENOUS
Status: DISCONTINUED | OUTPATIENT
Start: 2023-02-14 | End: 2023-02-17 | Stop reason: HOSPADM

## 2023-02-14 RX ORDER — HYDROXYZINE HYDROCHLORIDE 25 MG/1
25 TABLET, FILM COATED ORAL
Status: DISCONTINUED | OUTPATIENT
Start: 2023-02-14 | End: 2023-02-17 | Stop reason: HOSPADM

## 2023-02-14 RX ORDER — TRAZODONE HYDROCHLORIDE 100 MG/1
100 TABLET ORAL
Status: DISCONTINUED | OUTPATIENT
Start: 2023-02-14 | End: 2023-02-17 | Stop reason: HOSPADM

## 2023-02-14 RX ORDER — HALOPERIDOL 2 MG/1
2 TABLET ORAL
Status: CANCELLED | OUTPATIENT
Start: 2023-02-14

## 2023-02-14 RX ORDER — ACETAMINOPHEN 325 MG/1
650 TABLET ORAL EVERY 6 HOURS PRN
Status: CANCELLED | OUTPATIENT
Start: 2023-02-14

## 2023-02-14 RX ORDER — HALOPERIDOL 5 MG/1
5 TABLET ORAL
Status: DISCONTINUED | OUTPATIENT
Start: 2023-02-14 | End: 2023-02-17 | Stop reason: HOSPADM

## 2023-02-14 RX ORDER — HALOPERIDOL 5 MG/1
5 TABLET ORAL
Status: CANCELLED | OUTPATIENT
Start: 2023-02-14

## 2023-02-14 RX ORDER — ACETAMINOPHEN 325 MG/1
975 TABLET ORAL EVERY 6 HOURS PRN
Status: DISCONTINUED | OUTPATIENT
Start: 2023-02-14 | End: 2023-02-17 | Stop reason: HOSPADM

## 2023-02-14 RX ORDER — LORAZEPAM 2 MG/ML
1 INJECTION INTRAMUSCULAR
Status: DISCONTINUED | OUTPATIENT
Start: 2023-02-14 | End: 2023-02-17 | Stop reason: HOSPADM

## 2023-02-14 RX ORDER — HALOPERIDOL 5 MG/ML
2.5 INJECTION INTRAMUSCULAR
Status: CANCELLED | OUTPATIENT
Start: 2023-02-14

## 2023-02-14 RX ORDER — HYDROXYZINE 50 MG/1
50 TABLET, FILM COATED ORAL
Status: DISCONTINUED | OUTPATIENT
Start: 2023-02-14 | End: 2023-02-17 | Stop reason: HOSPADM

## 2023-02-14 RX ORDER — NICOTINE 21 MG/24HR
1 PATCH, TRANSDERMAL 24 HOURS TRANSDERMAL DAILY
Status: DISCONTINUED | OUTPATIENT
Start: 2023-02-15 | End: 2023-02-17 | Stop reason: HOSPADM

## 2023-02-14 RX ORDER — ACETAMINOPHEN 325 MG/1
975 TABLET ORAL EVERY 6 HOURS PRN
Status: CANCELLED | OUTPATIENT
Start: 2023-02-14

## 2023-02-14 RX ORDER — LORAZEPAM 1 MG/1
2 TABLET ORAL ONCE
Status: COMPLETED | OUTPATIENT
Start: 2023-02-14 | End: 2023-02-14

## 2023-02-14 RX ORDER — LORAZEPAM 1 MG/1
1 TABLET ORAL EVERY 6 HOURS PRN
Status: CANCELLED | OUTPATIENT
Start: 2023-02-14

## 2023-02-14 RX ORDER — TRAZODONE HYDROCHLORIDE 50 MG/1
100 TABLET ORAL
Status: CANCELLED | OUTPATIENT
Start: 2023-02-14

## 2023-02-14 RX ORDER — ACETAMINOPHEN 325 MG/1
650 TABLET ORAL EVERY 6 HOURS PRN
Status: DISCONTINUED | OUTPATIENT
Start: 2023-02-14 | End: 2023-02-17 | Stop reason: HOSPADM

## 2023-02-14 RX ORDER — LORAZEPAM 1 MG/1
1 TABLET ORAL EVERY 6 HOURS PRN
Status: DISCONTINUED | OUTPATIENT
Start: 2023-02-14 | End: 2023-02-17 | Stop reason: HOSPADM

## 2023-02-14 RX ORDER — ACETAMINOPHEN 325 MG/1
650 TABLET ORAL EVERY 4 HOURS PRN
Status: CANCELLED | OUTPATIENT
Start: 2023-02-14

## 2023-02-14 RX ORDER — MAGNESIUM HYDROXIDE/ALUMINUM HYDROXICE/SIMETHICONE 120; 1200; 1200 MG/30ML; MG/30ML; MG/30ML
30 SUSPENSION ORAL EVERY 4 HOURS PRN
Status: DISCONTINUED | OUTPATIENT
Start: 2023-02-14 | End: 2023-02-17 | Stop reason: HOSPADM

## 2023-02-14 RX ORDER — POLYETHYLENE GLYCOL 3350 17 G/17G
17 POWDER, FOR SOLUTION ORAL DAILY PRN
Status: DISCONTINUED | OUTPATIENT
Start: 2023-02-14 | End: 2023-02-17 | Stop reason: HOSPADM

## 2023-02-14 RX ORDER — LORAZEPAM 2 MG/ML
2 INJECTION INTRAMUSCULAR
Status: CANCELLED | OUTPATIENT
Start: 2023-02-14

## 2023-02-14 RX ORDER — NICOTINE 21 MG/24HR
1 PATCH, TRANSDERMAL 24 HOURS TRANSDERMAL DAILY
Status: CANCELLED | OUTPATIENT
Start: 2023-02-14

## 2023-02-14 RX ORDER — LORAZEPAM 2 MG/ML
1 INJECTION INTRAMUSCULAR EVERY 4 HOURS PRN
Status: CANCELLED | OUTPATIENT
Start: 2023-02-14

## 2023-02-14 RX ORDER — HYDROXYZINE 50 MG/1
50 TABLET, FILM COATED ORAL
Status: CANCELLED | OUTPATIENT
Start: 2023-02-14

## 2023-02-14 RX ORDER — MAGNESIUM HYDROXIDE/ALUMINUM HYDROXICE/SIMETHICONE 120; 1200; 1200 MG/30ML; MG/30ML; MG/30ML
30 SUSPENSION ORAL EVERY 4 HOURS PRN
Status: CANCELLED | OUTPATIENT
Start: 2023-02-14

## 2023-02-14 RX ORDER — HALOPERIDOL 5 MG/ML
2.5 INJECTION INTRAMUSCULAR
Status: DISCONTINUED | OUTPATIENT
Start: 2023-02-14 | End: 2023-02-17 | Stop reason: HOSPADM

## 2023-02-14 RX ORDER — BENZTROPINE MESYLATE 1 MG/ML
1 INJECTION INTRAMUSCULAR; INTRAVENOUS
Status: DISCONTINUED | OUTPATIENT
Start: 2023-02-14 | End: 2023-02-17 | Stop reason: HOSPADM

## 2023-02-14 RX ORDER — LORAZEPAM 2 MG/ML
1 INJECTION INTRAMUSCULAR EVERY 4 HOURS PRN
Status: DISCONTINUED | OUTPATIENT
Start: 2023-02-14 | End: 2023-02-17 | Stop reason: HOSPADM

## 2023-02-14 RX ORDER — NICOTINE 21 MG/24HR
21 PATCH, TRANSDERMAL 24 HOURS TRANSDERMAL ONCE
Status: DISCONTINUED | OUTPATIENT
Start: 2023-02-14 | End: 2023-02-14 | Stop reason: HOSPADM

## 2023-02-14 RX ORDER — LORAZEPAM 2 MG/ML
1 INJECTION INTRAMUSCULAR
Status: CANCELLED | OUTPATIENT
Start: 2023-02-14

## 2023-02-14 RX ORDER — HYDROXYZINE 50 MG/1
25 TABLET, FILM COATED ORAL
Status: CANCELLED | OUTPATIENT
Start: 2023-02-14

## 2023-02-14 RX ADMIN — LORAZEPAM 2 MG: 1 TABLET ORAL at 11:59

## 2023-02-14 RX ADMIN — NICOTINE POLACRILEX 2 MG: 2 GUM, CHEWING BUCCAL at 17:45

## 2023-02-14 RX ADMIN — HALOPERIDOL 2 MG: 2 TABLET ORAL at 23:18

## 2023-02-14 RX ADMIN — NICOTINE POLACRILEX 2 MG: 2 GUM, CHEWING BUCCAL at 19:47

## 2023-02-14 RX ADMIN — HYDROXYZINE HYDROCHLORIDE 50 MG: 50 TABLET, FILM COATED ORAL at 21:12

## 2023-02-14 RX ADMIN — NICOTINE 21 MG: 21 PATCH, EXTENDED RELEASE TRANSDERMAL at 10:03

## 2023-02-14 NOTE — NURSING NOTE
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 or if he could re-enlist, only stated "because of drinking" patient states he was living with friends when this incident occurred, he was alcohol free for 2 months but was playing video games and started drinking with friends but "drank too much and started getting emotional" would not elaborate on what his triggers are  Stated because he made SI comments his friends called police, and that he is no longer allowed to stay there  Asked patient about his family he chose not to discuss at that time  Patient states he had one suicide attempt 8 months ago that he tried to hang himself but the rope broke he did not attempt again   He denies any feelings of SI HI AVH anxiety or depression Q 7 min safety checks continue

## 2023-02-14 NOTE — ED NOTES
Patient placed mattress by the cover to cover the window  Patient is currently sitting in the corner         Wendy Robin  02/14/23 0811

## 2023-02-14 NOTE — ED NOTES
Patient is accepted at [facility]  Patient is accepted by Dr Gabriel Faith per 3 [facility] is TBD        Nurse report is to be called to [phone] prior to patient transfer

## 2023-02-14 NOTE — ED CARE HANDOFF
Emergency Department Sign Out Note        Sign out and transfer of care from Dr Ann-Marie Linton  See Separate Emergency Department note  The patient, Derrick Mosqueda, was evaluated by the previous provider for SI with plan to jump from an embankment  He also reported auditory and visceral hallucinations  He during did drink alcohol earlier in the evening but was medically cleared  Workup Completed:  Medical clearance, crisis eval  Daily medications ordered  Patient willingly signed a 61 51 81 but would require a 302 if he rescinded  ED Course / Workup Pending (followup): Bed search, psychiatric consult                                     Procedures  MDM        Disposition  Final diagnoses:   Agitation   Abrasion of forehead, initial encounter     Time reflects when diagnosis was documented in both MDM as applicable and the Disposition within this note     Time User Action Codes Description Comment    2/12/2023  2:19 AM Axel Springer Add [R45 1] Agitation     2/12/2023  2:19 AM Jacquie Jj Add [S00 81XA] Abrasion of forehead, initial encounter       ED Disposition     ED Disposition   Transfer to [address] [place] [address]   --    Date/Time   Sun Feb 12, 2023  2:18 AM    Comment   [facility] CARE should be transferred out to [address] [place] [address] and has been medically cleared  Follow-up Information    None       Patient's Medications    No medications on file     No discharge procedures on file         ED Provider  Electronically Signed by     John Block DO  02/14/23 1020 [place] [id number], DO  02/14/23 7819

## 2023-02-14 NOTE — ED NOTES
Pt  Spoke with father for 15 minutes on telephone and returned phone to staff like agreed upon  Tolerating fruit juices and strawberries well at present       Adin Jo RN  02/14/23 5252

## 2023-02-14 NOTE — ED NOTES
Patient is agitated and is currently banging and stomping  Patient flipped his bed mattress over    Nurse notified      Vikram Reyes  02/14/23 8597

## 2023-02-14 NOTE — PROGRESS NOTES
Pt has the following in his room   1 pair of jeans 1 tan t shirt 1 pair of sneakers     Storage  1 hoodie 2 jackets 1 pair of shoe laces 1 belt    Nurses station  1  Cell phone 1 Grizzly chew open 1 black vape 1 grape vape

## 2023-02-14 NOTE — ED NOTES
Pt just talked to sister and discovered that his father is still alive  Pt was told as a teenager that father had fallen ill and passed away  Pts sister informed pt that this was not true and provided pt with fathers phone number  Pt spoke on the phone with father for the first time in years and was tearful  Pt requesting to call father later in the day  Pt told by Nilsa Amaral   that he will be allowed to call father again later        Bulmaro Kellogg  02/14/23 4307

## 2023-02-14 NOTE — ED NOTES
Mattress removed from window area  Spoke with patient, stating he is not able to obstruct the view of the observer  Stress ball given to pt  Per his request   Declining medication so that he may coherently speak with the [facility] services        Robles Blunt RN  02/14/23 7716

## 2023-02-14 NOTE — ED NOTES
Patient tearful and anxious [facility]  "The person that was here with me yesterday " Offered po Ativan  Patient declined       Yesenia Guillermo RN  02/14/23 0689

## 2023-02-14 NOTE — ED CARE HANDOFF
Emergency Department Sign Out Note        Sign out and transfer of care from Dr aKden Mahmood  See Separate Emergency Department note  The patient, Annabel Friedman, was evaluated by the previous provider for 201/SI with Plan  Workup Completed:  Psych clearance    ED Course / Workup Pending (followup):                                      ED Course as of 02/14/23 0305   Sun Feb 12, 2023   2230 Received in sign out:     Pt is 201 for SI    Medically cleared:   CBC, CMP, ETOH and Salicylate/Tyelnol levels  Uds  Covid/flu    No issues during last shift    Bed search in progress       Tue Feb 14, 2023   0130 Received in sign out:     Pt is 201 for SI    Medically cleared:   CBC, CMP, ETOH and Salicylate/Tyelnol levels  Uds  Covid/flu    During last shift pt had on going sedation needs, requiring Ativan and Geodon     Dispo:   Bed search in progress       Procedures  MDM        Disposition  Final diagnoses:   Agitation   Abrasion of forehead, initial encounter     Time reflects when diagnosis was documented in both MDM as applicable and the Disposition within this note     Time User Action Codes Description Comment    2/12/2023  2:19 AM Lakshmi Talavera Add [R45 1] Agitation     2/12/2023  2:19 AM Juan Carlos Jj Add [S00 81XA] Abrasion of forehead, initial encounter       ED Disposition     ED Disposition   Transfer to [address] [place] [address]   --    Date/Time   Sun Feb 12, 2023  2:18 AM    Comment   [facility] should be transferred out to [facility] and has been medically cleared  Follow-up Information    None       Patient's Medications    No medications on file     No discharge procedures on file         ED Provider  Electronically Signed by     Orville Knight MD  02/14/23 6378

## 2023-02-14 NOTE — ED NOTES
Pt requesting meds to help him rest  Wants his phone, is aware he cannot have phone  Pt reports anxious about being able to contact family  Pt agreeable to meds        Radha Dickinson RN  02/13/23 8817

## 2023-02-14 NOTE — ED NOTES
Bed search:    [facility]: Laura- no beds  Ace Given: Amanda Hinojosa- bradley Piper [place]: German Shelley- no beds  [facility]: no answer  [place]: Venkat Leo- no beds  [place]: Bertha Crawford- no beds  Charlet Emery: Lilian Starkey- no beds  [facility]- no answer  Judit Perez: Enriqueta Rios- no beds  [facility]: Yvonne- no beds  [place]: Margaux Gutierrez- no beds  [place]: Jackie Garcia- no beds
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
## 2023-02-14 NOTE — ED NOTES
Pt reports needs help to sleep and rest feeling increasingly anxious/tearful  Md alerted        Isael Euceda RN  02/13/23 4236

## 2023-02-15 PROBLEM — F10.21 ALCOHOL USE DISORDER, MODERATE, IN EARLY REMISSION (HCC): Status: ACTIVE | Noted: 2023-02-15

## 2023-02-15 PROBLEM — F32.A DEPRESSION: Status: ACTIVE | Noted: 2023-02-15

## 2023-02-15 LAB
25(OH)D3 SERPL-MCNC: 10.7 NG/ML (ref 30–100)
ALBUMIN SERPL BCP-MCNC: 5.1 G/DL (ref 3.5–5)
ALP SERPL-CCNC: 48 U/L (ref 34–104)
ALT SERPL W P-5'-P-CCNC: 12 U/L (ref 7–52)
ANION GAP SERPL CALCULATED.3IONS-SCNC: 11 MMOL/L (ref 4–13)
AST SERPL W P-5'-P-CCNC: 18 U/L (ref 13–39)
ATRIAL RATE: 65 BPM
BASOPHILS # BLD AUTO: 0.06 THOUSANDS/ÂΜL (ref 0–0.1)
BASOPHILS NFR BLD AUTO: 1 % (ref 0–1)
BILIRUB SERPL-MCNC: 1.14 MG/DL (ref 0.2–1)
BUN SERPL-MCNC: 12 MG/DL (ref 5–25)
CALCIUM SERPL-MCNC: 10 MG/DL (ref 8.4–10.2)
CHLORIDE SERPL-SCNC: 102 MMOL/L (ref 96–108)
CHOLEST SERPL-MCNC: 149 MG/DL
CO2 SERPL-SCNC: 27 MMOL/L (ref 21–32)
CREAT SERPL-MCNC: 0.94 MG/DL (ref 0.6–1.3)
EOSINOPHIL # BLD AUTO: 0.17 THOUSAND/ÂΜL (ref 0–0.61)
EOSINOPHIL NFR BLD AUTO: 3 % (ref 0–6)
ERYTHROCYTE [DISTWIDTH] IN BLOOD BY AUTOMATED COUNT: 12.1 % (ref 11.6–15.1)
EST. AVERAGE GLUCOSE BLD GHB EST-MCNC: 97 MG/DL
FOLATE SERPL-MCNC: 14.7 NG/ML (ref 3.1–17.5)
GFR SERPL CREATININE-BSD FRML MDRD: 115 ML/MIN/1.73SQ M
GLUCOSE P FAST SERPL-MCNC: 100 MG/DL (ref 65–99)
GLUCOSE SERPL-MCNC: 100 MG/DL (ref 65–140)
HBA1C MFR BLD: 5 %
HCT VFR BLD AUTO: 46.3 % (ref 36.5–49.3)
HDLC SERPL-MCNC: 65 MG/DL
HGB BLD-MCNC: 15.6 G/DL (ref 12–17)
IMM GRANULOCYTES # BLD AUTO: 0 THOUSAND/UL (ref 0–0.2)
IMM GRANULOCYTES NFR BLD AUTO: 0 % (ref 0–2)
LDLC SERPL CALC-MCNC: 72 MG/DL (ref 0–100)
LYMPHOCYTES # BLD AUTO: 1.48 THOUSANDS/ÂΜL (ref 0.6–4.47)
LYMPHOCYTES NFR BLD AUTO: 30 % (ref 14–44)
MCH RBC QN AUTO: 30.3 PG (ref 26.8–34.3)
MCHC RBC AUTO-ENTMCNC: 33.7 G/DL (ref 31.4–37.4)
MCV RBC AUTO: 90 FL (ref 82–98)
MONOCYTES # BLD AUTO: 0.56 THOUSAND/ÂΜL (ref 0.17–1.22)
MONOCYTES NFR BLD AUTO: 11 % (ref 4–12)
NEUTROPHILS # BLD AUTO: 2.7 THOUSANDS/ÂΜL (ref 1.85–7.62)
NEUTS SEG NFR BLD AUTO: 55 % (ref 43–75)
NONHDLC SERPL-MCNC: 84 MG/DL
NRBC BLD AUTO-RTO: 0 /100 WBCS
P AXIS: 55 DEGREES
PLATELET # BLD AUTO: 263 THOUSANDS/UL (ref 149–390)
PMV BLD AUTO: 11 FL (ref 8.9–12.7)
POTASSIUM SERPL-SCNC: 4 MMOL/L (ref 3.5–5.3)
PR INTERVAL: 122 MS
PROT SERPL-MCNC: 7.6 G/DL (ref 6.4–8.4)
QRS AXIS: 80 DEGREES
QRSD INTERVAL: 96 MS
QT INTERVAL: 384 MS
QTC INTERVAL: 399 MS
RBC # BLD AUTO: 5.15 MILLION/UL (ref 3.88–5.62)
SODIUM SERPL-SCNC: 140 MMOL/L (ref 135–147)
T WAVE AXIS: 46 DEGREES
TRIGL SERPL-MCNC: 60 MG/DL
TSH SERPL DL<=0.05 MIU/L-ACNC: 1.57 UIU/ML (ref 0.45–4.5)
VENTRICULAR RATE: 65 BPM
VIT B12 SERPL-MCNC: 511 PG/ML (ref 100–900)
WBC # BLD AUTO: 4.97 THOUSAND/UL (ref 4.31–10.16)

## 2023-02-15 RX ORDER — ERGOCALCIFEROL 1.25 MG/1
50000 CAPSULE ORAL WEEKLY
Status: DISCONTINUED | OUTPATIENT
Start: 2023-02-15 | End: 2023-02-17 | Stop reason: HOSPADM

## 2023-02-15 RX ORDER — MELATONIN
1000 DAILY
Status: DISCONTINUED | OUTPATIENT
Start: 2023-05-17 | End: 2023-02-17 | Stop reason: HOSPADM

## 2023-02-15 RX ORDER — BUPROPION HYDROCHLORIDE 75 MG/1
75 TABLET ORAL DAILY
Status: DISCONTINUED | OUTPATIENT
Start: 2023-02-15 | End: 2023-02-17 | Stop reason: HOSPADM

## 2023-02-15 RX ADMIN — NICOTINE POLACRILEX 4 MG: 4 GUM, CHEWING BUCCAL at 19:52

## 2023-02-15 RX ADMIN — NICOTINE POLACRILEX 4 MG: 4 GUM, CHEWING BUCCAL at 17:11

## 2023-02-15 RX ADMIN — NICOTINE 1 PATCH: 21 PATCH, EXTENDED RELEASE TRANSDERMAL at 09:33

## 2023-02-15 RX ADMIN — NICOTINE POLACRILEX 2 MG: 2 GUM, CHEWING BUCCAL at 10:22

## 2023-02-15 RX ADMIN — NICOTINE POLACRILEX 2 MG: 2 GUM, CHEWING BUCCAL at 07:26

## 2023-02-15 RX ADMIN — NICOTINE POLACRILEX 2 MG: 2 GUM, CHEWING BUCCAL at 12:51

## 2023-02-15 RX ADMIN — TRAZODONE HYDROCHLORIDE 100 MG: 100 TABLET ORAL at 23:12

## 2023-02-15 RX ADMIN — NICOTINE POLACRILEX 4 MG: 4 GUM, CHEWING BUCCAL at 15:26

## 2023-02-15 NOTE — PLAN OF CARE
Problem: Ineffective Coping  Goal: Identifies ineffective coping skills  Outcome: Not Progressing  Goal: Identifies healthy coping skills  Outcome: Not Progressing  Goal: Demonstrates healthy coping skills  Outcome: Not Progressing  Goal: Participates in unit activities  Description: Interventions:  - Provide therapeutic environment   - Provide required programming   - Redirect inappropriate behaviors   Outcome: Not Progressing  Goal: Patient/Family participate in treatment and DC plans  Description: Interventions:  - Provide therapeutic environment  Outcome: Not Progressing  Goal: Patient/Family verbalizes awareness of resources  Outcome: Not Progressing  Goal: Understands least restrictive measures  Description: Interventions:  - Utilize least restrictive behavior  Outcome: Not Progressing     Problem: Depression  Goal: Treatment Goal: Demonstrate behavioral control of depressive symptoms, verbalize feelings of improved mood/affect, and adopt new coping skills prior to discharge  Outcome: Not Progressing  Goal: Verbalize thoughts and feelings  Description: Interventions:  - Assess and re-assess patient's level of risk   - Engage patient in 1:1 interactions, daily, for a minimum of 15 minutes   - Encourage patient to express feelings, fears, frustrations, hopes   Outcome: Not Progressing  Goal: Refrain from harming self  Description: Interventions:  - Monitor patient closely, per order   - Supervise medication ingestion, monitor effects and side effects   Outcome: Not Progressing  Goal: Refrain from isolation  Description: Interventions:  - Develop a trusting relationship   - Encourage socialization   Outcome: Not Progressing  Goal: Refrain from self-neglect  Outcome: Not Progressing  Goal: Attend and participate in unit activities, including therapeutic, recreational, and educational groups  Description: Interventions:  - Provide therapeutic and educational activities daily, encourage attendance and participation, and document same in the medical record   Outcome: Not Progressing  Goal: Complete daily ADLs, including personal hygiene independently, as able  Description: Interventions:  - Observe, teach, and assist patient with ADLS  -  Monitor and promote a balance of rest/activity, with adequate nutrition and elimination   Outcome: Not Progressing     Problem: Anxiety  Goal: Anxiety is at manageable level  Description: Interventions:  - Assess and monitor patient's anxiety level  - Monitor for signs and symptoms (heart palpitations, chest pain, shortness of breath, headaches, nausea, feeling jumpy, restlessness, irritable, apprehensive)  - Collaborate with interdisciplinary team and initiate plan and interventions as ordered    - [place] patient to unit/surroundings  - Explain treatment plan  - Encourage participation in care  - Encourage verbalization of concerns/fears  - Identify coping mechanisms  - Assist in developing anxiety-reducing skills  - Administer/offer alternative therapies  - Limit or eliminate stimulants  Outcome: Not Progressing
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
## 2023-02-15 NOTE — NURSING NOTE
Patient came to nurses station requesting IM ativan  Explained to patient that I can try a different PO medication  Patient insisting on an IM that PO does not work for him  Patient tearful but polite stating he has no place to go or no one to look for him  Patient denies [address]  Does endorse depression and anxiety  Patient refused prns and went to his room   Will monitor,

## 2023-02-15 NOTE — PLAN OF CARE
Problem: DISCHARGE PLANNING - CARE MANAGEMENT  Goal: Discharge to post-acute care or home with appropriate resources  Description: INTERVENTIONS:  - Conduct assessment to determine patient/family and health care team treatment goals, and need for post-acute services based on payer coverage, community resources, and patient preferences, and barriers to discharge  - Address psychosocial, clinical, and financial barriers to discharge as identified in assessment in conjunction with the patient/family and health care team  - Arrange appropriate level of post-acute services according to patient’s   needs and preference and payer coverage in collaboration with the physician and health care team  - Communicate with and update the patient/family, physician, and health care team regarding progress on the discharge plan  - Arrange appropriate transportation to post-acute venues  Outcome: Progressing     Pt new 201, pt progressing

## 2023-02-15 NOTE — NURSING NOTE
Patient pacing on unit  Patient noted to have increased agitation due to his phone call with step mother  Patient worried about his dog and where he will go  Patient tearful one minute and agitated the next  Agitation score 29, Broset score 1  Haldol 2 mg given  Will monitor
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
## 2023-02-15 NOTE — H&P
Lditis#  [birth year] Maykel Norton  DSR:[id number]    LSB:[id number]  Adm Date: 2/14/2023 1612  4:12 PM   ATT PHY: Remus Councilman, [address]         Chief Complaint: Worsening depression symptoms    History of Presenting Illness: Hilda Amin is a(n) 25y o  year old male was admitted through emergency department due to worsening depression symptoms along with suicidal ideations  Patient examined at bedside  Patient denied any active suicidal homicidal ideations  On review of patient's labs it was found that patient's vitamin D level was low 10 7  Patient's vitamin B12 level is a still pending  No Known Allergies    Current Facility-Administered Medications on File Prior to Encounter   Medication Dose Route Frequency Provider Last Rate Last Admin   • [COMPLETED] nicotine (NICODERM CQ) 21 mg/24 hr TD 24 hr patch 21 mg  21 mg Transdermal Once [employer], DO   21 mg at 02/13/23 1453   • [DISCONTINUED] nicotine (NICODERM CQ) 21 mg/24 hr TD 24 hr patch 21 mg  21 mg Transdermal Once [employer], DO   21 mg at 02/14/23 1003     No current outpatient medications on file prior to encounter  Active Ambulatory Problems     Diagnosis Date Noted   • No Active Ambulatory Problems     Resolved Ambulatory Problems     Diagnosis Date Noted   • No Resolved Ambulatory Problems     Past Medical History:   Diagnosis Date   • Alcohol abuse    • Depression        History reviewed  No pertinent surgical history      Social History:   Social History     Socioeconomic History   • Marital status: Single     Spouse name: None   • Number of children: None   • Years of education: None   • Highest education level: None   Occupational History   • None   Tobacco Use   • Smoking status: Some Days     Packs/day: 0 25     Years: 5 00     Pack years: 1 25     Types: Cigarettes   • Smokeless tobacco: Never   Substance and Sexual Activity   • Alcohol use: Not Currently   • Drug use: Not Currently     Types: Marijuana   • Sexual activity: Not Currently   Other Topics Concern   • None   Social History Narrative   • None     Social Determinants of Health     Financial Resource Strain: Not on file   Food Insecurity: Not on file   Transportation Needs: Not on file   Physical Activity: Not on file   Stress: Not on file   Social Connections: Not on file   Intimate Partner Violence: Not on file   Housing Stability: Not on file       Family History:   Family History   Problem Relation Age of Onset   • No Known Problems Mother    • No Known Problems Father        Review of Systems   Musculoskeletal: Positive for arthralgias  Neurological: Positive for headaches  Psychiatric/Behavioral: Positive for sleep disturbance  All other systems reviewed and are negative  Physical Exam   Vitals: Blood pressure 112/68, pulse 64, temperature 98 7 °F (37 1 °C), temperature source Temporal, resp  rate 18, height 6' 3" (1 905 m), weight 71 6 kg (157 lb 12 8 oz), SpO2 100 %  ,Body mass index is 19 72 kg/m²  Constitutional: Awake and Alert  Well-developed and well-nourished  No distress  HENT: PERR,EOMI, conjunctiva normal  Head: Normocephalic and atraumatic  Mouth/Throat: Oropharynx is clear and moist     Eyes: Conjunctivae and EOM are normal  Pupils are equal, round, and reactive to light  Right and left eye exhibits no discharge  Neck: Neck supple  No tracheal deviation present  No thyromegaly present  Cardiovascular: Normal rate, regular rhythm and normal heart sounds  Exam reveals no friction rub  No murmur heard  Pulmonary/Chest: Effort normal and breath sounds normal  No respiratory distress  She has no wheezes  Abdominal: Soft  Bowel sounds are normal  She exhibits no distension  There is no tenderness  There is no rebound and no guarding  Neurological: Cranial Nerves grossly intact  No sensory deficit   Coordination normal    Musculoskeletal:   Nontender spine  Skin: Skin is warm and dry  No rash noted  No diaphoresis  No erythema  No edema  No cyanosis  Assessment     Oni Otto is a(n) 25y o  year old male with MDD    1  Tobacco abuse  Patient has been put on nicotine transdermal patch 21 mg daily along with nicotine gum as needed  2   Arthralgia/headache  Patient may get Tylenol as needed  3   Insomnia  Patient may get trazodone as needed  4   Alcohol abuse  Patient seems a stable at this time and does not suspect any withdrawals  5   New vitamin D deficiency  I will put the patient on vitamin D bolus doses for 14 weeks followed by vitamin D3 1000 units daily  6   Psych with MDD  Patient is being managed by psych  Prognosis: Fair  Discharge Plan: In progress  Advanced Directives: I have discussed in detail the patient the advanced directives  Patient has not appointed anybody as his POA and has no living will with advanced healthcare directives  Patient's first contact is his stepparent Sav Chong and his phone number is [phone]  When discussing cardiac and pulmonary resuscitation efforts with the patient, the patient wishes to be FULL CODE  I have spent more than 50 minutes gathering data, doing physical examination, and discussing the advanced directives, which was witnessed by caring staff
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
## 2023-02-15 NOTE — PROGRESS NOTES
02/15/23 0745   Activity/Group Checklist   Group   (Morning Coffee and Check-In)   Attendance Attended   Attendance Duration (min) 31-45   Interactions Interacted appropriately   Affect/Mood Appropriate; Constricted   Goals Achieved Identified feelings; Identified triggers; Able to listen to others; Able to engage in interactions

## 2023-02-15 NOTE — TREATMENT PLAN
Treatment Plan [address] [place] [address] [facility] 25 [facility]  [facility] MRN: [id number]    [address] Encounter: [id number]          Admit Date/Time:  2/14/2023  4:12 PM    Treatment Team: Attending Provider: Johnnie Carlton MD; Consulting Physician: Beau Valdes MD; Patient Care Assistant: Kell Perez; Care Manager: Myrtle Cabrera, RN; Patient Care Assistant: Brand Cogan;  : Celso Campbell; Registered Nurse: Javier Rosado RN; Recreational Therapist: Norm Sensing    Diagnosis: Principal Problem:    Depression  Active Problems:    Alcohol use disorder, moderate, in early remission [facility]      Mental Status Evaluation:  Appearance:  age appropriate   Behavior:  guarded   Speech:  normal pitch and normal volume   Mood:  dysthymic   Affect:  constricted   Language: naming objects and repeating phrases   Thought Process:  normal   Thought Content:  normal   Perceptual Disturbances: None   Risk Potential: Suicidal Ideations with plan to hang self   Sensorium:  person, place and time/date   Cognition:  recent and remote memory grossly intact   Consciousness:  alert    Attention: attention span and concentration were age appropriate   Intellect: within normal limits   Fund of Knowledge: awareness of current events: yes   Insight:  limited   Judgment: limited   Muscle Location: face, neck and torso   Gait/Station: normal gait/station   Motor Activity: no abnormal movements     Patient Strengths: average or above intelligence, capable of independent living, communication skills, good physical health, motivation for treatment/growth, patient is voluntary, is willing to work on problems, work skills     Patient Barriers: homeless, lack of social/family support, resistance to treatment, substance abuse    Progress Toward Goals: progressing    Recommended Treatment: Behavioral Health treatment evaluation/assessment process will continue     Treatment Frequency: once times per day Expected Discharge Date:     Discharge Plan: return to previous living arrangement, referrals as indicated     Treatment Plan Created/Updated By: Karen Lopez MD

## 2023-02-15 NOTE — PROGRESS NOTES
02/15/23 1320   Team Meeting   Meeting Type Tx Team Meeting   Team Members Present   Team Members Present Physician;Nurse;   Physician Team Member Dr Wander eMade MD   Nursing Team Member Kurt Monroe RN   Social Work Team Member Avelino Manzo   Patient/Family Present   Patient Present Yes   Patient's Family Present No     Patient and treatment team met and reviewed pt strengths, limitations, coping skills, treatment plan and goals; pt agreeable and signed; copy on chart

## 2023-02-15 NOTE — PROGRESS NOTES
02/15/23   Team Meeting   Meeting Type Daily Rounds   Team Members Present   Team Members Present Physician;Nurse;   Physician Team Member Dr Prabhakar Clark MD; Roya Bernard; Dr Massimo Reynolds MD   Nursing Team Member Meredith Johnson RN; Gladys Perez RN   Social Work Team Member Dotty Choe [place]   Patient/Family Present   Patient Present No   Patient's Family Present No     New 201, relationship stressors, SI, pleasant, guarded, ETOH use, hx [employer] service

## 2023-02-16 RX ADMIN — NICOTINE POLACRILEX 4 MG: 4 GUM, CHEWING BUCCAL at 18:52

## 2023-02-16 RX ADMIN — NICOTINE POLACRILEX 4 MG: 4 GUM, CHEWING BUCCAL at 11:02

## 2023-02-16 RX ADMIN — NICOTINE POLACRILEX 4 MG: 4 GUM, CHEWING BUCCAL at 23:57

## 2023-02-16 RX ADMIN — NICOTINE POLACRILEX 4 MG: 4 GUM, CHEWING BUCCAL at 08:50

## 2023-02-16 RX ADMIN — NICOTINE 1 PATCH: 21 PATCH, EXTENDED RELEASE TRANSDERMAL at 10:14

## 2023-02-16 RX ADMIN — NICOTINE POLACRILEX 4 MG: 4 GUM, CHEWING BUCCAL at 13:09

## 2023-02-16 NOTE — PROGRESS NOTES
Progress Note - Catracho Sandhu 25 y o  male MRN: [id number]    Unit/Bed#: Yashira Wolff 009-67 Encounter: [id number]        Subjective:   Patient seen and examined at bedside after reviewing the chart and discussing the case with the caring staff  Patient examined at bedside  Patient has no acute issues  On review of patient's labs it was found that patient's vitamin D level was low 10 7  Patient's vitamin B12 level was found to be normal 511  Physical Exam   Vitals: Blood pressure 103/52, pulse 87, temperature 99 5 °F (37 5 °C), temperature source Temporal, resp  rate 18, height 6' 3" (1 905 m), weight 71 6 kg (157 lb 12 8 oz), SpO2 98 %  ,Body mass index is 19 72 kg/m²  Constitutional: He appears well-developed  HEENT: PERR, EOMI, MMM  Cardiovascular: Normal rate and regular rhythm  Pulmonary/Chest: Effort normal and breath sounds normal    Abdomen: Soft, + BS, NT    Assessment/Plan:  Catracho Sandhu is a(n) 25y o  year old male with MDD     1  Tobacco abuse  Patient has been put on nicotine transdermal patch 21 mg daily along with nicotine gum as needed  2   Arthralgia/headache  Patient may get Tylenol as needed  3   Insomnia  Patient may get trazodone as needed  4   Alcohol abuse  Patient seems a stable at this time and does not suspect any withdrawals  5   New vitamin D deficiency  I will put the patient on vitamin D bolus doses for 14 weeks followed by vitamin D3 1000 units daily

## 2023-02-16 NOTE — PLAN OF CARE
Problem: Depression  Goal: Refrain from harming self  Description: Interventions:  - Monitor patient closely, per order   - Supervise medication ingestion, monitor effects and side effects   Outcome: Progressing     Problem: Depression  Goal: Refrain from isolation  Description: Interventions:  - Develop a trusting relationship   - Encourage socialization   Outcome: Progressing

## 2023-02-16 NOTE — NURSING NOTE
Pt calm and cooperative throughout shift  Guarded but polite  Refuses am Wellbutrin  Presents as anxious  Describes just "really wanting to get out of here " Focused on DC plans to go move in w/ father and meeting w/ [facility]  Expresses severe disappointment that the in-person VA meeting was unable to happen today  Denies SI/HI/AVH  Endorses mild to moderate anxiety and depression regarding the current circumstances of admission  Seems to minimize events that led to admission  States, "I just said something stupid that I didn't mean " Pt spends time on phone in quiet room  Is approved by staff to be able to talk w/ VA rep in quiet room for privacy, and complaints of back pain in which he refuses analgesics  Pt also refuses PRN medication for anxiety when becoming visibly anxious and slightly tearful throughout shift  Pt uses exercise a coping skill, but is unwilling to elaborate on why he is upset  Staff support provided, and encouragement to be open w/ staff about feelings in order to get most appropriate care  Will cont Q7 rounding and offer support and encouragement as needed

## 2023-02-16 NOTE — NURSING NOTE
Assumed patient care at this time  No distress noted  Q 7 maintained and continued  Will continue to monitor

## 2023-02-16 NOTE — SOCIAL WORK
Laney left  for Keon Kern (mother) [phone] regarding pts dc to her garage tomorrow to obtain belongings    *JOHANA restriction: no medical information to be released to [place]*
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
## 2023-02-16 NOTE — DISCHARGE INSTR - OTHER ORDERS
You are being discharged to your mother's located at [address] before driving to your father's home located at [address], [address], [place], [address] [facility]  Phone: [phone]  Triggers you have identified during your hospitalization that led to your admission of a distressed mood include family stressors and ineffective coping skills  Coping skills you have identified during your hospitalization include music and exercise  If you are unable to deal with your distressed mood alone please contact Vic Estrada at (father) [phone]  If that is not effective and you continue to have a distressed mood, are overwhelmed, or in crisis, please contact (Crisis #) [place], [facility] or [facility] [phone], dial [phone] or go to the nearest emergency center  *Call or Text [place] crisis: [phone] or Live chat: PlannerBlog com cy  com/    [address] Crisis Hotline: Lakshmi Parker Suicide Prevention Lifeline:  2-256.288.9987  *Alcohol Anonymous: 198.778.4811  *[place] Drug & Alcohol Commission: (571) 397-7094  [address] [facility]  on [address] [facility] ([facility]) HELPLINE: 109.632.8165/Website: www pedro Offerama  *Substance Abuse and [address] Administration([facility]) [employer], which is a confidential, free, 24-hour-a-day, 365-day-a-year, information service for individuals and family members facing mental health and/or substance use disorders  This service provides referrals to local treatment facilities, support groups, and community-based organizations  Callers can also order free publications and other information    Call 8-291.423.3751/Website: www [facility] gov  *[facility] 2-1-1: This is a toll free, confidential, 24-hour-a-day service which connects you to a community  in your area who can help you find services and resources that are available to you locally and provide critical services that can improve and save lives   Call: 80  Jesus Manuel Wallyeunices: https://julienne-sam net/     You declined a follow up primary care provider appointment and psychiatric follow up at this time  Kiarra Briceño or Elli, shoshana Montana and Milo, will be calling you after your discharge, on the phone number that you provided  They will be available as an additional support, if needed  If you wish to speak with one of them, you may contact Elizabeth Evans at [phone] or Gregg Trammell at [phone]

## 2023-02-16 NOTE — PROGRESS NOTES
02/15/23 1000   Activity/Group Checklist   Group   ([place] Self-Expression Art Therapy)   Attendance Attended   Attendance Duration (min) 46-60   Interactions Interacted appropriately   Affect/Mood Constricted   Goals Achieved Able to listen to others; Able to engage in interactions

## 2023-02-16 NOTE — PROGRESS NOTES
02/16/23 0730   Activity/Group Checklist   Group   (Morning Coffee and Check-In)   Attendance Attended   Attendance Duration (min) 46-60   Interactions Interacted appropriately   Affect/Mood Appropriate   Goals Achieved Able to listen to others; Able to engage in interactions

## 2023-02-16 NOTE — PROGRESS NOTES
02/16/23   Team Meeting   Meeting Type Daily Rounds   Team Members Present   Team Members Present Physician;Nurse;   Physician Team Member Dr Uche Licona MD; Reina Pimentel; Dr Simin Wilson MD   Nursing Team Member VALENCIA Oshea; Jolaine Goltz, RN   Social Work Team Member Dilip Pisano [place]   Patient/Family Present   Patient Present No   Patient's Family Present No     Frequent phone use, med selective

## 2023-02-16 NOTE — SOCIAL WORK
Sw met with pt in small group room to confirm dc planning  Pt would like to dc to [employer] truck, obtain belongings from garage on mother's property, drive to father's home in [place]  Pt signed JOHANA for father, mother-number unknown, does not want medical information shared with mother, only dc planning  Sw called Saw Jara (father) [phone], confirmed pt can dc to his home located at [address] [facility] [address] [facility] [address], [address] [address]  Tiny Blanks confirms pt can only stay at his home for 2 weeks due to apartment rules, has not seen pt in 10yrs, unsure why pt thought he was   Call ended mutually
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
## 2023-02-16 NOTE — NURSING NOTE
Patient (ex?) girlfriend Deepak Person  and her mother arrived on site requesting to speak to a staff person  Deepak reports receiving multiple calls from patient while on unit where he is stating bizarre things and "being manipulative"  Deepak requesting to share concerns with team  Dr Christopher Simon provided with contact to hear concerns, understanding we do not have a release and can not discuss details of care  Deepak acknowledged

## 2023-02-16 NOTE — DISCHARGE INSTR - APPOINTMENTS
Please access a [facility]ENTER of your choice upon arrival to [place], [place], as you declined a follow up medication management appointment at this time  Main locations  [facility]  Alert: COVID-19 health protection: Levels high  [address] [facility]  Main phone: [phone]  Mental health care: [phone]    Health clinic locations  [address]: COVID-19 health protection: Levels high  [facility], [address] [facility] [address], [address]  Main phone: [phone]  Mental health care: [phone]: COVID-19 health protection: Levels high  [place]  [facility], [address]  Main phone: [phone]  Mental health care: [phone] [address]: COVID-19 health protection: Levels high  [address] [facility], [address] [place] [address], [id number] [place] [address]  Main phone: [phone]  Mental health care: [phone] [facility] Pedro: COVID-19 health protection: Levels high  [address], [facility]  [facility]  Main phone: [phone] [address]: COVID-19 health protection: Levels high  [address], [address]  Main phone: [phone]  Mental health care: [phone] [address]: COVID-19 health protection: Levels high  [address], [address], [address]  Main phone: [phone]  Mental health care: [phone]    Other nearby [address], [address] [facility] [address], [address]  Main phone: [phone]

## 2023-02-16 NOTE — PROGRESS NOTES
02/16/23 1030   Activity/Group Checklist   Group   (Creative Expression)   Attendance Attended   Attendance Duration (min) 46-60   Interactions Interacted appropriately   Affect/Mood Appropriate   Goals Achieved Identified feelings; Discussed coping strategies

## 2023-02-16 NOTE — NURSING NOTE
PT visible on floor  Appears anxious although denies SI, HI, AVH,anxiety  Does endorse mild depression  PT observed talking on phone  Safety checks continue

## 2023-02-16 NOTE — PROGRESS NOTES
02/16/23 1300   Activity/Group Checklist   Group   (Pet Therapy)   Attendance Attended   Attendance Duration (min) 31-45   Interactions Interacted appropriately   Affect/Mood Appropriate   Goals Achieved Identified feelings; Discussed coping strategies

## 2023-02-17 VITALS
TEMPERATURE: 98.5 F | SYSTOLIC BLOOD PRESSURE: 100 MMHG | BODY MASS INDEX: 19.62 KG/M2 | HEART RATE: 105 BPM | OXYGEN SATURATION: 98 % | RESPIRATION RATE: 18 BRPM | DIASTOLIC BLOOD PRESSURE: 71 MMHG | WEIGHT: 157.8 LBS | HEIGHT: 75 IN

## 2023-02-17 PROBLEM — F39 MOOD DISORDER (HCC): Status: ACTIVE | Noted: 2023-02-15

## 2023-02-17 PROBLEM — F63.9 IMPULSE CONTROL DISORDER IN ADULT: Status: ACTIVE | Noted: 2023-02-17

## 2023-02-17 RX ADMIN — NICOTINE POLACRILEX 4 MG: 4 GUM, CHEWING BUCCAL at 11:00

## 2023-02-17 RX ADMIN — NICOTINE POLACRILEX 4 MG: 4 GUM, CHEWING BUCCAL at 08:45

## 2023-02-17 NOTE — NURSING NOTE
Pt looking forward to discharge home  Transportation will be provided by girlfriend  Pt denies SI, HI, or hallucinations  Safety precautions maintained

## 2023-02-17 NOTE — PROGRESS NOTES
Pt is going home with the following items   1 pair of jeans 1 pair of sneakers     Storage  1 hoodie 2 jackets 1 pair of sneaker shoe laces 1 belt    Nurses Station  1 cell phone 1 [employer] open can 1 black vape 1 grape vape

## 2023-02-17 NOTE — SIGNIFICANT EVENT
02/17/23 0758   Activity/Group Checklist   Group   (Morning Coffee and Check-In)   Attendance Attended   Attendance Duration (min) 31-45   Interactions Interacted appropriately   Affect/Mood Appropriate;Calm   Goals Achieved Identified feelings; Identified triggers; Identified relapse prevention strategies; Identified medication adherence strategies; Discussed coping strategies

## 2023-02-17 NOTE — BH TRANSITION RECORD
Contact Information: If you have any questions, concerns, pended studies, tests and/or procedures, or emergencies regarding your inpatient behavioral health visit  Please contact Mihaela Esteban" [facility] behavioral health unit [phone] and ask to speak to a , nurse or physician  A contact is available 24 hours/ 7 days a week at this number  Summary of Procedures Performed During your Stay:  Below is a list of major procedures performed during your hospital stay and a summary of results:  - No major procedures performed  Pending Studies (From admission, onward)    None        Please follow up on the above pending studies with your PCP and/or referring provider

## 2023-02-17 NOTE — NURSING NOTE
Patient visible on the unit  Isolative to self  Observed on the phone numerous times  Patient appears anxious  Patient focused on his dog  Denies SI,HI,AVH  Safety checks continue Q 7 minutes

## 2023-02-17 NOTE — NURSING NOTE
Patient was observed to be calm on the beginning of the shift  If patient was not pacing the hallway quietly, he was observe dot be talking over the phone  At around 2230 patient approached this writer to report that he needs to call the police to file a report about his dog whom he refers as his therapy dog who was given away by his foster parents and patient reported to this writer that there was a verbal agreement made between the foster parents and him with the [facility] representatives that the foster parents will get hold of his dog until he gets discharge for him to get the dog  The patient reported that he got the information from his girlfriend  This writer called the supervisor and the supervisor advised that this writer call the police station to ask for what could be the possible steps the patient can do  This writer never got through with the call as no one was answering the phone  This writer was able to encourage the patient to calm self and to focus on his discharge and that patient can plan and do more in the morning  Patient was able to hold his frustration and anger  Patient reported that he cannot sleep through the night  Patient refused to take any medications offered  Patient paces in the hallway  He was still very pleasant, respectful, was able to hold anxiety, frustration very well  Will continue to monitor and provide support

## 2023-02-17 NOTE — PROGRESS NOTES
Progress Note - Bear Reyes 25 y o  male MRN: [id number]    Unit/Bed#: [facility] 213-71 Encounter: [id number]        Subjective:   Patient seen and examined at bedside after reviewing the chart and discussing the case with the caring staff  Patient examined at bedside  Patient has no acute issues  Patient is scheduled for discharge today, Friday, 2/17/2023  Physical Exam   Vitals: Blood pressure 100/71, pulse 105, temperature 98 5 °F (36 9 °C), temperature source Temporal, resp  rate 18, height 6' 3" (1 905 m), weight 71 6 kg (157 lb 12 8 oz), SpO2 98 %  ,Body mass index is 19 72 kg/m²  Constitutional: Patient in no acute distress  HEENT: PERR, EOMI, MMM  Cardiovascular: Normal rate and regular rhythm  Pulmonary/Chest: Effort normal and breath sounds normal    Abdomen: Nondistended    Assessment/Plan:  Bear Reyes is a(n) 25y o  year old male with MDD  MEDICAL CLEARANCE: Patient is medically cleared for discharge      1  Tobacco abuse  Patient is on nicotine transdermal patch 21 mg daily  2   Hx alcohol abuse  Stable  3   Vitamin D deficiency  Patient started on vitamin D supplement  The patient was discussed with Dr Jenny Wade and he is in agreement with the above note

## 2023-02-17 NOTE — PROGRESS NOTES
02/17/23   Team Meeting   Meeting Type Daily Rounds   Team Members Present   Team Members Present Physician;Nurse;   Physician Team Member Dr Rocio Dukes; Nia Peter; Dr Milvia Márquez MD   Nursing Team Member Saurav Morris RN; Nia Peter   Social Work Team Member Nicolette Johnson [place]   Patient/Family Present   Patient Present No   Patient's Family Present No     DC to mother's home, will travel to [place] to live with father, [place] resources provided, pacing unit, effective talk with crisis worker yest, declined meds

## 2023-02-17 NOTE — NURSING NOTE
Patient confirmed for discharge today  Patient informed his girlfriend Deepak will be picking up  This writer called girlfriend and confirmed pickup for today at 200 today  Girlfriend reiterated patient plan to  belongings from mom's house then drop off at [facility] for truck rental  Team aware

## 2023-02-17 NOTE — PLAN OF CARE
Problem: Anxiety  Goal: Anxiety is at manageable level  Description: Interventions:  - Assess and monitor patient's anxiety level  - Monitor for signs and symptoms (heart palpitations, chest pain, shortness of breath, headaches, nausea, feeling jumpy, restlessness, irritable, apprehensive)  - Collaborate with interdisciplinary team and initiate plan and interventions as ordered    - [place] patient to unit/surroundings  - Explain treatment plan  - Encourage participation in care  - Encourage verbalization of concerns/fears  - Identify coping mechanisms  - Assist in developing anxiety-reducing skills  - Administer/offer alternative therapies  - Limit or eliminate stimulants  Outcome: Progressing
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
## 2023-07-07 ENCOUNTER — APPOINTMENT (EMERGENCY)
Dept: CT IMAGING | Facility: HOSPITAL | Age: 23
End: 2023-07-07
Payer: COMMERCIAL

## 2023-07-07 ENCOUNTER — APPOINTMENT (EMERGENCY)
Dept: RADIOLOGY | Facility: HOSPITAL | Age: 23
End: 2023-07-07
Payer: COMMERCIAL

## 2023-07-07 ENCOUNTER — HOSPITAL ENCOUNTER (EMERGENCY)
Facility: HOSPITAL | Age: 23
Discharge: HOME | End: 2023-07-07
Attending: EMERGENCY MEDICINE
Payer: COMMERCIAL

## 2023-07-07 VITALS
OXYGEN SATURATION: 98 % | TEMPERATURE: 98.5 F | SYSTOLIC BLOOD PRESSURE: 122 MMHG | RESPIRATION RATE: 18 BRPM | HEART RATE: 83 BPM | WEIGHT: 167.55 LBS | DIASTOLIC BLOOD PRESSURE: 69 MMHG | BODY MASS INDEX: 20.94 KG/M2
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
## 2023-07-07 LAB
ABO GROUP BLD: NORMAL
ABO GROUP BLD: NORMAL
ANION GAP SERPL CALCULATED.3IONS-SCNC: 10 MMOL/L
BASOPHILS # BLD AUTO: 0.06 THOUSANDS/ÂΜL (ref 0–0.1)
BASOPHILS NFR BLD AUTO: 0 % (ref 0–1)
BLD GP AB SCN SERPL QL: NEGATIVE
BUN SERPL-MCNC: 8 MG/DL (ref 5–25)
CALCIUM SERPL-MCNC: 8.8 MG/DL (ref 8.4–10.2)
CHLORIDE SERPL-SCNC: 103 MMOL/L (ref 96–108)
CO2 SERPL-SCNC: 27 MMOL/L (ref 21–32)
CREAT SERPL-MCNC: 0.99 MG/DL (ref 0.6–1.3)
EOSINOPHIL # BLD AUTO: 0.28 THOUSAND/ÂΜL (ref 0–0.61)
EOSINOPHIL NFR BLD AUTO: 2 % (ref 0–6)
ERYTHROCYTE [DISTWIDTH] IN BLOOD BY AUTOMATED COUNT: 12.7 % (ref 11.6–15.1)
ETHANOL SERPL-MCNC: 182 MG/DL
GFR SERPL CREATININE-BSD FRML MDRD: 108 ML/MIN/1.73SQ M
GLUCOSE SERPL-MCNC: 128 MG/DL (ref 65–140)
HCT VFR BLD AUTO: 46.2 % (ref 36.5–49.3)
HGB BLD-MCNC: 15.4 G/DL (ref 12–17)
IMM GRANULOCYTES # BLD AUTO: 0.06 THOUSAND/UL (ref 0–0.2)
IMM GRANULOCYTES NFR BLD AUTO: 0 % (ref 0–2)
LYMPHOCYTES # BLD AUTO: 1.79 THOUSANDS/ÂΜL (ref 0.6–4.47)
LYMPHOCYTES NFR BLD AUTO: 12 % (ref 14–44)
MCH RBC QN AUTO: 30.4 PG (ref 26.8–34.3)
MCHC RBC AUTO-ENTMCNC: 33.3 G/DL (ref 31.4–37.4)
MCV RBC AUTO: 91 FL (ref 82–98)
MONOCYTES # BLD AUTO: 0.98 THOUSAND/ÂΜL (ref 0.17–1.22)
MONOCYTES NFR BLD AUTO: 6 % (ref 4–12)
NEUTROPHILS # BLD AUTO: 12.39 THOUSANDS/ÂΜL (ref 1.85–7.62)
NEUTS SEG NFR BLD AUTO: 80 % (ref 43–75)
NRBC BLD AUTO-RTO: 0 /100 WBCS
PLATELET # BLD AUTO: 339 THOUSANDS/UL (ref 149–390)
PMV BLD AUTO: 10.1 FL (ref 8.9–12.7)
POTASSIUM SERPL-SCNC: 4.1 MMOL/L (ref 3.5–5.3)
RBC # BLD AUTO: 5.07 MILLION/UL (ref 3.88–5.62)
RH BLD: POSITIVE
RH BLD: POSITIVE
SODIUM SERPL-SCNC: 140 MMOL/L (ref 135–147)
SPECIMEN EXPIRATION DATE: NORMAL
WBC # BLD AUTO: 15.56 THOUSAND/UL (ref 4.31–10.16)

## 2023-07-07 PROCEDURE — 82077 ASSAY SPEC XCP UR&BREATH IA: CPT | Performed by: EMERGENCY MEDICINE

## 2023-07-07 PROCEDURE — 99284 EMERGENCY DEPT VISIT MOD MDM: CPT

## 2023-07-07 PROCEDURE — 70450 CT HEAD/BRAIN W/O DYE: CPT

## 2023-07-07 PROCEDURE — 86900 BLOOD TYPING SEROLOGIC ABO: CPT | Performed by: EMERGENCY MEDICINE

## 2023-07-07 PROCEDURE — 80048 BASIC METABOLIC PNL TOTAL CA: CPT | Performed by: EMERGENCY MEDICINE

## 2023-07-07 PROCEDURE — 72125 CT NECK SPINE W/O DYE: CPT

## 2023-07-07 PROCEDURE — 86850 RBC ANTIBODY SCREEN: CPT | Performed by: EMERGENCY MEDICINE

## 2023-07-07 PROCEDURE — 85025 COMPLETE CBC W/AUTO DIFF WBC: CPT | Performed by: EMERGENCY MEDICINE

## 2023-07-07 PROCEDURE — 74177 CT ABD & PELVIS W/CONTRAST: CPT

## 2023-07-07 PROCEDURE — 86901 BLOOD TYPING SEROLOGIC RH(D): CPT | Performed by: EMERGENCY MEDICINE

## 2023-07-07 PROCEDURE — 71260 CT THORAX DX C+: CPT

## 2023-07-07 PROCEDURE — 36415 COLL VENOUS BLD VENIPUNCTURE: CPT | Performed by: EMERGENCY MEDICINE

## 2023-07-07 RX ADMIN — IOHEXOL 100 ML: 350 INJECTION, SOLUTION INTRAVENOUS at 04:27

## 2023-07-07 NOTE — ED NOTES
Patient colton. Asked him to go back to room. He said he is leaving . I told him I will get Dr. He stated to me you get him right now. I informed him I would get DrTania And he will come when available. Patient then proceeded to yell" Fuck You " at me.   Informed pt would like to speak with him     Carson Henry RN  07/07/23 9605
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
## 2023-09-15 ENCOUNTER — APPOINTMENT (OUTPATIENT)
Dept: CT IMAGING | Facility: HOSPITAL | Age: 23
End: 2023-09-15
Payer: COMMERCIAL

## 2023-09-15 ENCOUNTER — APPOINTMENT (OUTPATIENT)
Dept: RADIOLOGY | Facility: HOSPITAL | Age: 23
End: 2023-09-15
Payer: COMMERCIAL

## 2023-09-15 ENCOUNTER — HOSPITAL ENCOUNTER (EMERGENCY)
Facility: HOSPITAL | Age: 23
Discharge: HOME | End: 2023-09-15
Attending: EMERGENCY MEDICINE
Payer: COMMERCIAL

## 2023-09-15 VITALS
RESPIRATION RATE: 18 BRPM | TEMPERATURE: 98 F | OXYGEN SATURATION: 94 % | SYSTOLIC BLOOD PRESSURE: 105 MMHG | DIASTOLIC BLOOD PRESSURE: 58 MMHG | HEART RATE: 86 BPM

## 2023-09-15 DIAGNOSIS — R46.89 AGGRESSIVE BEHAVIOR OF ADULT: ICD-10-CM
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
## 2023-09-15 DIAGNOSIS — F10.229 ACUTE ALCOHOL INTOXICATION WITH ALCOHOLISM (HCC): Primary | ICD-10-CM

## 2023-09-15 LAB
ABO GROUP BLD: NORMAL
ALBUMIN SERPL BCP-MCNC: 4.8 G/DL (ref 3.5–5)
ALP SERPL-CCNC: 86 U/L (ref 34–104)
ALT SERPL W P-5'-P-CCNC: 15 U/L (ref 7–52)
ANION GAP SERPL CALCULATED.3IONS-SCNC: 11 MMOL/L
APAP SERPL-MCNC: <10 UG/ML (ref 10–20)
APTT PPP: 27 SECONDS (ref 23–37)
AST SERPL W P-5'-P-CCNC: 26 U/L (ref 13–39)
ATRIAL RATE: 101 BPM
BASOPHILS # BLD AUTO: 0.07 THOUSANDS/ÂΜL (ref 0–0.1)
BASOPHILS NFR BLD AUTO: 1 % (ref 0–1)
BILIRUB SERPL-MCNC: 0.37 MG/DL (ref 0.2–1)
BLD GP AB SCN SERPL QL: NEGATIVE
BUN SERPL-MCNC: 19 MG/DL (ref 5–25)
CALCIUM SERPL-MCNC: 9.1 MG/DL (ref 8.4–10.2)
CHLORIDE SERPL-SCNC: 104 MMOL/L (ref 96–108)
CO2 SERPL-SCNC: 24 MMOL/L (ref 21–32)
CREAT SERPL-MCNC: 0.96 MG/DL (ref 0.6–1.3)
EOSINOPHIL # BLD AUTO: 0.07 THOUSAND/ÂΜL (ref 0–0.61)
EOSINOPHIL NFR BLD AUTO: 1 % (ref 0–6)
ERYTHROCYTE [DISTWIDTH] IN BLOOD BY AUTOMATED COUNT: 12.9 % (ref 11.6–15.1)
ETHANOL SERPL-MCNC: 279 MG/DL
GFR SERPL CREATININE-BSD FRML MDRD: 111 ML/MIN/1.73SQ M
GLUCOSE SERPL-MCNC: 102 MG/DL (ref 65–140)
HCT VFR BLD AUTO: 45 % (ref 36.5–49.3)
HGB BLD-MCNC: 15.1 G/DL (ref 12–17)
IMM GRANULOCYTES # BLD AUTO: 0.02 THOUSAND/UL (ref 0–0.2)
IMM GRANULOCYTES NFR BLD AUTO: 0 % (ref 0–2)
INR PPP: 0.92 (ref 0.84–1.19)
LYMPHOCYTES # BLD AUTO: 1.18 THOUSANDS/ÂΜL (ref 0.6–4.47)
LYMPHOCYTES NFR BLD AUTO: 16 % (ref 14–44)
MCH RBC QN AUTO: 30.9 PG (ref 26.8–34.3)
MCHC RBC AUTO-ENTMCNC: 33.6 G/DL (ref 31.4–37.4)
MCV RBC AUTO: 92 FL (ref 82–98)
MONOCYTES # BLD AUTO: 0.49 THOUSAND/ÂΜL (ref 0.17–1.22)
MONOCYTES NFR BLD AUTO: 7 % (ref 4–12)
NEUTROPHILS # BLD AUTO: 5.7 THOUSANDS/ÂΜL (ref 1.85–7.62)
NEUTS SEG NFR BLD AUTO: 75 % (ref 43–75)
NRBC BLD AUTO-RTO: 0 /100 WBCS
P AXIS: 84 DEGREES
PLATELET # BLD AUTO: 287 THOUSANDS/UL (ref 149–390)
PMV BLD AUTO: 9.9 FL (ref 8.9–12.7)
POTASSIUM SERPL-SCNC: 3.9 MMOL/L (ref 3.5–5.3)
PR INTERVAL: 126 MS
PROT SERPL-MCNC: 6.9 G/DL (ref 6.4–8.4)
PROTHROMBIN TIME: 12.6 SECONDS (ref 11.6–14.5)
QRS AXIS: 88 DEGREES
QRSD INTERVAL: 104 MS
QT INTERVAL: 360 MS
QTC INTERVAL: 466 MS
RBC # BLD AUTO: 4.88 MILLION/UL (ref 3.88–5.62)
RH BLD: POSITIVE
SALICYLATES SERPL-MCNC: <5 MG/DL (ref 3–20)
SODIUM SERPL-SCNC: 139 MMOL/L (ref 135–147)
SPECIMEN EXPIRATION DATE: NORMAL
T WAVE AXIS: 60 DEGREES
VENTRICULAR RATE: 101 BPM
WBC # BLD AUTO: 7.53 THOUSAND/UL (ref 4.31–10.16)

## 2023-09-15 PROCEDURE — 85025 COMPLETE CBC W/AUTO DIFF WBC: CPT | Performed by: EMERGENCY MEDICINE

## 2023-09-15 PROCEDURE — 86850 RBC ANTIBODY SCREEN: CPT | Performed by: EMERGENCY MEDICINE

## 2023-09-15 PROCEDURE — 80179 DRUG ASSAY SALICYLATE: CPT | Performed by: EMERGENCY MEDICINE

## 2023-09-15 PROCEDURE — G1004 CDSM NDSC: HCPCS

## 2023-09-15 PROCEDURE — 80143 DRUG ASSAY ACETAMINOPHEN: CPT | Performed by: EMERGENCY MEDICINE

## 2023-09-15 PROCEDURE — 86900 BLOOD TYPING SEROLOGIC ABO: CPT | Performed by: EMERGENCY MEDICINE

## 2023-09-15 PROCEDURE — 86901 BLOOD TYPING SEROLOGIC RH(D): CPT | Performed by: EMERGENCY MEDICINE

## 2023-09-15 PROCEDURE — 72125 CT NECK SPINE W/O DYE: CPT

## 2023-09-15 PROCEDURE — 93010 ELECTROCARDIOGRAM REPORT: CPT | Performed by: INTERNAL MEDICINE

## 2023-09-15 PROCEDURE — 71250 CT THORAX DX C-: CPT

## 2023-09-15 PROCEDURE — 99291 CRITICAL CARE FIRST HOUR: CPT | Performed by: EMERGENCY MEDICINE

## 2023-09-15 PROCEDURE — 70486 CT MAXILLOFACIAL W/O DYE: CPT

## 2023-09-15 PROCEDURE — 96372 THER/PROPH/DIAG INJ SC/IM: CPT

## 2023-09-15 PROCEDURE — 93005 ELECTROCARDIOGRAM TRACING: CPT

## 2023-09-15 PROCEDURE — 70450 CT HEAD/BRAIN W/O DYE: CPT

## 2023-09-15 PROCEDURE — 99285 EMERGENCY DEPT VISIT HI MDM: CPT

## 2023-09-15 PROCEDURE — 36415 COLL VENOUS BLD VENIPUNCTURE: CPT | Performed by: EMERGENCY MEDICINE

## 2023-09-15 PROCEDURE — 82077 ASSAY SPEC XCP UR&BREATH IA: CPT | Performed by: EMERGENCY MEDICINE

## 2023-09-15 PROCEDURE — 74176 CT ABD & PELVIS W/O CONTRAST: CPT

## 2023-09-15 PROCEDURE — 80053 COMPREHEN METABOLIC PANEL: CPT | Performed by: EMERGENCY MEDICINE

## 2023-09-15 PROCEDURE — 85610 PROTHROMBIN TIME: CPT | Performed by: EMERGENCY MEDICINE

## 2023-09-15 PROCEDURE — 85730 THROMBOPLASTIN TIME PARTIAL: CPT | Performed by: EMERGENCY MEDICINE

## 2023-09-15 RX ORDER — MIDAZOLAM HYDROCHLORIDE 2 MG/2ML
1 INJECTION, SOLUTION INTRAMUSCULAR; INTRAVENOUS ONCE
Status: COMPLETED | OUTPATIENT
Start: 2023-09-15 | End: 2023-09-15

## 2023-09-15 RX ORDER — KETAMINE HYDROCHLORIDE 50 MG/ML
4 INJECTION, SOLUTION, CONCENTRATE INTRAMUSCULAR; INTRAVENOUS ONCE
Status: DISCONTINUED | OUTPATIENT
Start: 2023-09-15 | End: 2023-09-15

## 2023-09-15 RX ORDER — HALOPERIDOL 5 MG/ML
5 INJECTION INTRAMUSCULAR ONCE
Status: COMPLETED | OUTPATIENT
Start: 2023-09-15 | End: 2023-09-15

## 2023-09-15 RX ADMIN — MIDAZOLAM 1 MG: 1 INJECTION INTRAMUSCULAR; INTRAVENOUS at 02:52

## 2023-09-15 RX ADMIN — HALOPERIDOL LACTATE 5 MG: 5 INJECTION, SOLUTION INTRAMUSCULAR at 02:51

## 2023-09-15 RX ADMIN — MIDAZOLAM 1 MG: 1 INJECTION INTRAMUSCULAR; INTRAVENOUS at 03:01

## 2023-09-15 NOTE — RESTRAINT FACE TO FACE
Restraint Face to Face   Zaynab Falcon 26 y.o. male MRN: [id number]  Unit/Bed#: TR 03 Encounter: [id number]      Physical Evaluation: superficial abrasions, patient belligerent   Purpose for Restraints/ Seclusion High risk for causing significant disruption of treatment environment  and High risk for harm to others  Patient's reaction to the intervention: Angered, belligerent   Patient's medical condition: stable  Patient's Behavioral condition: improved after sedating meds  Restraints to be Continued

## 2023-09-15 NOTE — ED NOTES
Patient awoke very beligerant, cursing at the staff and 1:1. Osmin Espino his own IV out. Assessed by physician, staff called for ride and patient discharged.       Brandie Valdes RN  09/15/23 5742
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
## 2023-09-15 NOTE — ED NOTES
Pt has been aggressive and belligerent towards staff since his arrival. The pt is screaming obscenities at the staff. Pt is threatening staff.       Heidy Harden  09/15/23 9336
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
## 2023-09-15 NOTE — ED NOTES
Dash Ruby [phone]    States he is a friend of the patient and wants to be called.     Flores Fly (Pt's girlfriend) was in the waiting room and now left [facility]  09/15/23 6886